# Patient Record
Sex: MALE | Race: WHITE | NOT HISPANIC OR LATINO | Employment: OTHER | URBAN - METROPOLITAN AREA
[De-identification: names, ages, dates, MRNs, and addresses within clinical notes are randomized per-mention and may not be internally consistent; named-entity substitution may affect disease eponyms.]

---

## 2023-10-25 DIAGNOSIS — I10 HYPERTENSION, UNSPECIFIED TYPE: Primary | ICD-10-CM

## 2023-10-25 PROBLEM — E11.9 DIABETES (MULTI): Status: ACTIVE | Noted: 2018-07-26

## 2023-10-25 PROBLEM — R60.9 EDEMA: Status: ACTIVE | Noted: 2023-10-25

## 2023-10-25 PROBLEM — R79.89 ELEVATED SERUM CREATININE: Status: ACTIVE | Noted: 2023-10-25

## 2023-10-25 PROBLEM — I47.10 PAROXYSMAL SVT (SUPRAVENTRICULAR TACHYCARDIA) (CMS-HCC): Status: ACTIVE | Noted: 2023-10-25

## 2023-10-25 PROBLEM — M54.31 RIGHT SCIATIC NERVE PAIN: Status: ACTIVE | Noted: 2023-03-07

## 2023-10-25 PROBLEM — E83.42 HYPOMAGNESEMIA: Status: ACTIVE | Noted: 2023-10-25

## 2023-10-25 PROBLEM — R00.2 PALPITATION: Status: ACTIVE | Noted: 2023-10-25

## 2023-10-25 PROBLEM — K57.31 HEMORRHAGE OF LARGE INTESTINE DUE TO DIVERTICULAR DISEASE: Status: ACTIVE | Noted: 2023-10-25

## 2023-10-25 PROBLEM — J06.9 VIRAL URI: Status: ACTIVE | Noted: 2020-12-29

## 2023-10-25 PROBLEM — D64.9 ANEMIA: Status: ACTIVE | Noted: 2022-07-20

## 2023-10-25 PROBLEM — N40.0 BPH WITH ELEVATED PSA: Status: ACTIVE | Noted: 2022-07-20

## 2023-10-25 PROBLEM — Z95.1 S/P CABG X 1: Status: ACTIVE | Noted: 2023-10-25

## 2023-10-25 PROBLEM — R07.89 ATYPICAL CHEST PAIN: Status: ACTIVE | Noted: 2023-10-25

## 2023-10-25 PROBLEM — K62.5 RECTAL HEMORRHAGE: Status: ACTIVE | Noted: 2023-10-25

## 2023-10-25 PROBLEM — R97.20 BPH WITH ELEVATED PSA: Status: ACTIVE | Noted: 2022-07-20

## 2023-10-25 PROBLEM — I34.0 MITRAL REGURGITATION: Status: ACTIVE | Noted: 2023-10-25

## 2023-10-25 PROBLEM — T63.441A BEE STING: Status: ACTIVE | Noted: 2023-10-25

## 2023-10-25 PROBLEM — I25.10 ARTERIOSCLEROSIS OF CORONARY ARTERY: Status: ACTIVE | Noted: 2021-07-19

## 2023-10-25 PROBLEM — R06.09 DOE (DYSPNEA ON EXERTION): Status: ACTIVE | Noted: 2023-10-25

## 2023-10-25 PROBLEM — R97.20 ELEVATED PSA: Status: ACTIVE | Noted: 2020-03-04

## 2023-10-25 PROBLEM — E78.5 HYPERLIPIDEMIA: Status: ACTIVE | Noted: 2023-10-25

## 2023-10-25 RX ORDER — VITS A,C,E/LUTEIN/MINERALS 300MCG-200
1 TABLET ORAL DAILY
COMMUNITY

## 2023-10-25 RX ORDER — ASPIRIN 81 MG/1
81 TABLET ORAL DAILY
COMMUNITY
Start: 2018-06-21

## 2023-10-25 RX ORDER — MULTIVITAMIN
1 TABLET ORAL DAILY
COMMUNITY

## 2023-10-25 RX ORDER — SPIRONOLACTONE 25 MG/1
25 TABLET ORAL DAILY
COMMUNITY
End: 2023-10-26 | Stop reason: WASHOUT

## 2023-10-25 RX ORDER — LOVASTATIN 40 MG/1
40 TABLET ORAL NIGHTLY
COMMUNITY

## 2023-10-25 RX ORDER — LISINOPRIL 40 MG/1
40 TABLET ORAL 2 TIMES DAILY
COMMUNITY
End: 2023-10-26 | Stop reason: SDUPTHER

## 2023-10-25 RX ORDER — AMLODIPINE BESYLATE 2.5 MG/1
2.5 TABLET ORAL DAILY
COMMUNITY
Start: 2020-03-04 | End: 2023-10-26 | Stop reason: WASHOUT

## 2023-10-26 ENCOUNTER — OFFICE VISIT (OUTPATIENT)
Dept: CARDIOLOGY | Facility: CLINIC | Age: 83
End: 2023-10-26
Payer: MEDICARE

## 2023-10-26 VITALS
WEIGHT: 166.2 LBS | OXYGEN SATURATION: 98 % | HEIGHT: 70 IN | SYSTOLIC BLOOD PRESSURE: 147 MMHG | HEART RATE: 62 BPM | BODY MASS INDEX: 23.79 KG/M2 | DIASTOLIC BLOOD PRESSURE: 68 MMHG

## 2023-10-26 DIAGNOSIS — I25.10 ARTERIOSCLEROSIS OF CORONARY ARTERY: ICD-10-CM

## 2023-10-26 DIAGNOSIS — I47.10 PAROXYSMAL SVT (SUPRAVENTRICULAR TACHYCARDIA) (CMS-HCC): ICD-10-CM

## 2023-10-26 DIAGNOSIS — Z95.1 S/P CABG X 1: ICD-10-CM

## 2023-10-26 DIAGNOSIS — E78.5 HYPERLIPIDEMIA, UNSPECIFIED HYPERLIPIDEMIA TYPE: ICD-10-CM

## 2023-10-26 DIAGNOSIS — I10 HYPERTENSION, UNSPECIFIED TYPE: Primary | ICD-10-CM

## 2023-10-26 PROCEDURE — 99214 OFFICE O/P EST MOD 30 MIN: CPT | Performed by: INTERNAL MEDICINE

## 2023-10-26 PROCEDURE — 3078F DIAST BP <80 MM HG: CPT | Performed by: INTERNAL MEDICINE

## 2023-10-26 PROCEDURE — 1036F TOBACCO NON-USER: CPT | Performed by: INTERNAL MEDICINE

## 2023-10-26 PROCEDURE — 1159F MED LIST DOCD IN RCRD: CPT | Performed by: INTERNAL MEDICINE

## 2023-10-26 PROCEDURE — 3077F SYST BP >= 140 MM HG: CPT | Performed by: INTERNAL MEDICINE

## 2023-10-26 RX ORDER — LISINOPRIL 40 MG/1
40 TABLET ORAL 2 TIMES DAILY
Qty: 90 TABLET | Refills: 3 | Status: SHIPPED | OUTPATIENT
Start: 2023-10-26 | End: 2024-04-15

## 2023-10-26 RX ORDER — AMLODIPINE BESYLATE 10 MG/1
10 TABLET ORAL DAILY
COMMUNITY
End: 2024-02-23

## 2023-10-26 ASSESSMENT — ENCOUNTER SYMPTOMS
MEMORY LOSS: 0
DEPRESSION: 0
NAUSEA: 0
BLOATING: 0
COUGH: 0
HEMATURIA: 0
FALLS: 0
DIARRHEA: 0
CONSTIPATION: 0
WHEEZING: 0
ABDOMINAL PAIN: 0
VOMITING: 0
MYALGIAS: 0
ALTERED MENTAL STATUS: 0
FEVER: 0
HEMOPTYSIS: 0
HEADACHES: 0
DYSURIA: 0
CHILLS: 0

## 2023-10-26 NOTE — PROGRESS NOTES
Chief complaint:  Follow-up     HPI  84 yo WM w/ h/o CAD s/p CABG x3v '00, parox SVT, HTN, HPL, TOB (quit '75) now here for cardiology f/u. He is feeling good. No chest pain. No dyspnea unless climbing stairs. +long h/o rare palps/hot flash x seconds. No LH/dizziness/syncope. No recent edema (prior occ edema). No claudication. No cough.   BP at home (occ checked; occ home nurse): 110s-120s/60s (home cuff correlated 2/20)  ECG 1/18: SR (81)  ECG 9/21: SR (64), occ PVCs  HM 12/17 (48h): SR, HR  (avg 69), occ-freq PVCs (9%), SVTx5 (long 7 mins), palps x1 correlated w/ PVCs  Echo 12/17: EF 55%, DD, mild-mod MR  Echo 4/23: EF 55%, mild AI, mild MR, IVC 2.0  CT chest 9/21: no acute abnl, no peric eff, no aneurysm     Review of Systems   Constitutional: Negative for chills, fever and malaise/fatigue.   HENT:  Negative for hearing loss.    Eyes:  Negative for visual disturbance.   Respiratory:  Negative for cough, hemoptysis and wheezing.    Skin:  Negative for rash.   Musculoskeletal:  Negative for falls and myalgias.   Gastrointestinal:  Negative for bloating, abdominal pain, constipation, diarrhea, dysphagia, nausea and vomiting.   Genitourinary:  Negative for dysuria and hematuria.   Neurological:  Negative for headaches.   Psychiatric/Behavioral:  Negative for altered mental status, depression and memory loss.         Social History     Tobacco Use    Smoking status: Former     Packs/day: 1.00     Years: 20.00     Additional pack years: 0.00     Total pack years: 20.00     Types: Cigarettes     Quit date: 2020     Years since quitting: 3.8    Smokeless tobacco: Never   Substance Use Topics    Alcohol use: Not on file        Family History   Problem Relation Name Age of Onset    Heart disease Father          Allergies   Allergen Reactions    Bee Venom Protein (Honey Bee) Unknown        Current Outpatient Medications   Medication Instructions    amLODIPine (NORVASC) 10 mg, oral, Daily    aspirin 81 mg, oral, Daily     lisinopril 40 mg, oral, 2 times daily    lovastatin (MEVACOR) 40 mg, oral, Nightly    metoprolol tartrate (LOPRESSOR) 100 mg, oral, 2 times daily    multivitamin tablet 1 tablet, oral, Daily    vit A,C and E-lutein-minerals (Ocuvite with Lutein) 300 mcg-200 mg-27 mg-2 mg tablet 1 tablet, oral, Daily        Vitals:    10/26/23 1125   BP: 150/74   Pulse:    SpO2:       Vitals:    10/26/23 1139   BP: 147/68   Pulse:    SpO2:         Physical Exam  Constitutional:       Appearance: Normal appearance.   HENT:      Head: Normocephalic and atraumatic.      Nose: Nose normal.   Cardiovascular:      Rate and Rhythm: Normal rate and regular rhythm.      Heart sounds: No murmur heard.  Pulmonary:      Effort: Pulmonary effort is normal.      Breath sounds: Normal breath sounds.   Abdominal:      Palpations: Abdomen is soft.      Tenderness: There is no abdominal tenderness.   Musculoskeletal:      Right lower leg: No edema.      Left lower leg: No edema.   Skin:     General: Skin is warm and dry.   Neurological:      General: No focal deficit present.      Mental Status: He is alert.   Psychiatric:         Mood and Affect: Mood normal.         Judgment: Judgment normal.        Lab Results   Component Value Date    CR 1.2      HGB 12.9      K 4.3      MG      BNP No results found for requested labs within last 365 days.            LDL       Assessment/Plan   82 yo WM w/ h/o CAD s/p CABG x3v '00, parox SVT, HTN, HPL, TOB (quit '75). Doing well. Only rare palps (?SVT). BP high today; however, good at home with visiting RN checks.  Notify me if BP at home >140/90.  -continue ASA 81 qd with food  -continue Metoprolol 100 bid (consider change to Carvedilol if needed for HTN)  -continue Lisinopril 40 bid  -continue Amlodipine 10 qd  -continue Lova 40 qhs (?GI upset on Atorva) -> goal LDL <70  -f/u 9 months (earlier if needed)     Alvin Regalado MD

## 2023-10-26 NOTE — TELEPHONE ENCOUNTER
Patient needed refill request for lisinopril as well. I know metoprolol was filled. I see you saw him in clinic today wasn't sure if this was refilled. If not please approve pending order. Thanks.

## 2024-01-15 ENCOUNTER — TELEPHONE (OUTPATIENT)
Dept: PRIMARY CARE | Facility: CLINIC | Age: 84
End: 2024-01-15
Payer: MEDICARE

## 2024-01-15 DIAGNOSIS — E11.9 TYPE 2 DIABETES MELLITUS WITHOUT COMPLICATION, WITHOUT LONG-TERM CURRENT USE OF INSULIN (MULTI): ICD-10-CM

## 2024-01-16 ENCOUNTER — LAB (OUTPATIENT)
Dept: LAB | Facility: LAB | Age: 84
End: 2024-01-16
Payer: MEDICARE

## 2024-01-16 DIAGNOSIS — E11.9 TYPE 2 DIABETES MELLITUS WITHOUT COMPLICATION, WITHOUT LONG-TERM CURRENT USE OF INSULIN (MULTI): ICD-10-CM

## 2024-01-16 LAB
ALBUMIN SERPL-MCNC: 4.2 G/DL (ref 3.5–5)
ALP BLD-CCNC: 86 U/L (ref 35–125)
ALT SERPL-CCNC: 8 U/L (ref 5–40)
ANION GAP SERPL CALC-SCNC: 11 MMOL/L
AST SERPL-CCNC: 14 U/L (ref 5–40)
BILIRUB SERPL-MCNC: 0.6 MG/DL (ref 0.1–1.2)
BUN SERPL-MCNC: 17 MG/DL (ref 8–25)
CALCIUM SERPL-MCNC: 9.5 MG/DL (ref 8.5–10.4)
CHLORIDE SERPL-SCNC: 104 MMOL/L (ref 97–107)
CO2 SERPL-SCNC: 23 MMOL/L (ref 24–31)
CREAT SERPL-MCNC: 1.2 MG/DL (ref 0.4–1.6)
EGFRCR SERPLBLD CKD-EPI 2021: 60 ML/MIN/1.73M*2
EST. AVERAGE GLUCOSE BLD GHB EST-MCNC: 123 MG/DL
GLUCOSE SERPL-MCNC: 143 MG/DL (ref 65–99)
HBA1C MFR BLD: 5.9 %
POTASSIUM SERPL-SCNC: 3.9 MMOL/L (ref 3.4–5.1)
PROT SERPL-MCNC: 6.8 G/DL (ref 5.9–7.9)
SODIUM SERPL-SCNC: 138 MMOL/L (ref 133–145)

## 2024-01-16 PROCEDURE — 36415 COLL VENOUS BLD VENIPUNCTURE: CPT

## 2024-01-16 PROCEDURE — 80053 COMPREHEN METABOLIC PANEL: CPT

## 2024-01-16 PROCEDURE — 83036 HEMOGLOBIN GLYCOSYLATED A1C: CPT

## 2024-01-18 ENCOUNTER — LAB (OUTPATIENT)
Dept: LAB | Facility: LAB | Age: 84
End: 2024-01-18
Payer: MEDICARE

## 2024-01-18 DIAGNOSIS — E11.9 TYPE 2 DIABETES MELLITUS WITHOUT COMPLICATION, WITHOUT LONG-TERM CURRENT USE OF INSULIN (MULTI): ICD-10-CM

## 2024-01-18 LAB
CREAT UR-MCNC: 166 MG/DL
MICROALBUMIN UR-MCNC: 52 MG/L (ref 0–23)
MICROALBUMIN/CREAT UR: 31.3 UG/MG CREAT

## 2024-01-18 PROCEDURE — 82043 UR ALBUMIN QUANTITATIVE: CPT

## 2024-01-18 PROCEDURE — 82570 ASSAY OF URINE CREATININE: CPT

## 2024-01-22 ENCOUNTER — TELEPHONE (OUTPATIENT)
Dept: PRIMARY CARE | Facility: CLINIC | Age: 84
End: 2024-01-22

## 2024-01-22 ENCOUNTER — OFFICE VISIT (OUTPATIENT)
Dept: PRIMARY CARE | Facility: CLINIC | Age: 84
End: 2024-01-22
Payer: MEDICARE

## 2024-01-22 VITALS
RESPIRATION RATE: 16 BRPM | HEART RATE: 64 BPM | DIASTOLIC BLOOD PRESSURE: 62 MMHG | BODY MASS INDEX: 23.82 KG/M2 | OXYGEN SATURATION: 99 % | WEIGHT: 166 LBS | SYSTOLIC BLOOD PRESSURE: 120 MMHG

## 2024-01-22 DIAGNOSIS — Z12.5 SCREENING FOR PROSTATE CANCER: ICD-10-CM

## 2024-01-22 DIAGNOSIS — R73.03 PREDIABETES: ICD-10-CM

## 2024-01-22 DIAGNOSIS — I10 BENIGN ESSENTIAL HTN: ICD-10-CM

## 2024-01-22 DIAGNOSIS — I25.10 ARTERIOSCLEROSIS OF CORONARY ARTERY: ICD-10-CM

## 2024-01-22 DIAGNOSIS — E08.65 DIABETES MELLITUS DUE TO UNDERLYING CONDITION WITH HYPERGLYCEMIA, WITHOUT LONG-TERM CURRENT USE OF INSULIN (MULTI): ICD-10-CM

## 2024-01-22 DIAGNOSIS — E78.00 PURE HYPERCHOLESTEROLEMIA: Primary | ICD-10-CM

## 2024-01-22 DIAGNOSIS — E78.00 PURE HYPERCHOLESTEROLEMIA: ICD-10-CM

## 2024-01-22 DIAGNOSIS — Z79.899 MEDICATION MANAGEMENT: ICD-10-CM

## 2024-01-22 PROCEDURE — 3074F SYST BP LT 130 MM HG: CPT | Performed by: FAMILY MEDICINE

## 2024-01-22 PROCEDURE — 3078F DIAST BP <80 MM HG: CPT | Performed by: FAMILY MEDICINE

## 2024-01-22 PROCEDURE — 1036F TOBACCO NON-USER: CPT | Performed by: FAMILY MEDICINE

## 2024-01-22 PROCEDURE — 1125F AMNT PAIN NOTED PAIN PRSNT: CPT | Performed by: FAMILY MEDICINE

## 2024-01-22 PROCEDURE — 1159F MED LIST DOCD IN RCRD: CPT | Performed by: FAMILY MEDICINE

## 2024-01-22 PROCEDURE — 99214 OFFICE O/P EST MOD 30 MIN: CPT | Performed by: FAMILY MEDICINE

## 2024-01-22 ASSESSMENT — PAIN SCALES - GENERAL: PAINLEVEL: 4

## 2024-01-22 ASSESSMENT — PATIENT HEALTH QUESTIONNAIRE - PHQ9
2. FEELING DOWN, DEPRESSED OR HOPELESS: NOT AT ALL
SUM OF ALL RESPONSES TO PHQ9 QUESTIONS 1 AND 2: 0
1. LITTLE INTEREST OR PLEASURE IN DOING THINGS: NOT AT ALL

## 2024-01-22 ASSESSMENT — ENCOUNTER SYMPTOMS
LOSS OF SENSATION IN FEET: 0
OCCASIONAL FEELINGS OF UNSTEADINESS: 0
DEPRESSION: 0

## 2024-01-22 NOTE — PROGRESS NOTES
Subjective   Patient ID: Olayinka Burnette is a 83 y.o. male who presents for Diabetes.    HPI   1. He is here for follow-up of high cholesterol.  He is on atorvastatin 20 mg.  Most recent LDL is 61.      2. He is also here for follow-up of benign essential hypertension.  He is on lisinopril 80 mg, metoprolol succinate 100 mg and amlodipine 2.5 mg (added in 2019 by cardiologist).  He is followed by cardiologist (Dr. Regalado at ).      3.  He is also here for follow-up of diabetes type 2.  He is on no medication.   Recent A1C is  5.9.      4. He is also here for follow-up of coronary artery disease.   He is s/p CABG x3 in 2000. He also has paroxysmal supraventricular tachycardia.  He is followed by cardiologist Dr. Regalado.      Review of Systems    Denies headache, blurred vision, chest pain, shortness of breath, nausea or vomiting, change in bowel habits or leg pain or swelling.    Objective   /62   Pulse 64   Resp 16   Wt 75.3 kg (166 lb)   SpO2 99%   BMI 23.82 kg/m²     Physical Exam  Vitals and nurse's notes reviewed  General: no acute distress  HEENT: Normal  Neck: Supple  Lungs: Clear  Cardio: RRR w/o murmur  Extremities: No edema, no calf tenderness  Neuro: Alert and oriented with no focal deficits    Assessment/Plan   Problem List Items Addressed This Visit             ICD-10-CM       High    Arteriosclerosis of coronary artery I25.10     Continue current medication and following with cardiologist.         Benign essential HTN I10     Continue current medication and following with cardiologist.         Hyperlipidemia - Primary E78.5     Continue current medications.  Recheck in 6 months.           Prediabetes R73.03     Continue off medication.  Continue low-carb diet.  Recheck A1c again in 6 months at [facility].

## 2024-07-22 ENCOUNTER — LAB (OUTPATIENT)
Dept: LAB | Facility: LAB | Age: 84
End: 2024-07-22
Payer: MEDICARE

## 2024-07-22 DIAGNOSIS — E78.00 PURE HYPERCHOLESTEROLEMIA: ICD-10-CM

## 2024-07-22 DIAGNOSIS — Z79.899 MEDICATION MANAGEMENT: ICD-10-CM

## 2024-07-22 DIAGNOSIS — E08.65 DIABETES MELLITUS DUE TO UNDERLYING CONDITION WITH HYPERGLYCEMIA, WITHOUT LONG-TERM CURRENT USE OF INSULIN (MULTI): ICD-10-CM

## 2024-07-22 DIAGNOSIS — I10 BENIGN ESSENTIAL HTN: ICD-10-CM

## 2024-07-22 DIAGNOSIS — Z12.5 SCREENING FOR PROSTATE CANCER: ICD-10-CM

## 2024-07-22 LAB
ALBUMIN SERPL-MCNC: 4.3 G/DL (ref 3.5–5)
ALP BLD-CCNC: 84 U/L (ref 35–125)
ALT SERPL-CCNC: 11 U/L (ref 5–40)
ANION GAP SERPL CALC-SCNC: 12 MMOL/L
AST SERPL-CCNC: 15 U/L (ref 5–40)
BASOPHILS # BLD AUTO: 0.05 X10*3/UL (ref 0–0.1)
BASOPHILS NFR BLD AUTO: 0.9 %
BILIRUB SERPL-MCNC: 0.4 MG/DL (ref 0.1–1.2)
BUN SERPL-MCNC: 20 MG/DL (ref 8–25)
CALCIUM SERPL-MCNC: 9.2 MG/DL (ref 8.5–10.4)
CHLORIDE SERPL-SCNC: 105 MMOL/L (ref 97–107)
CHOLEST SERPL-MCNC: 149 MG/DL (ref 133–200)
CHOLEST/HDLC SERPL: 3.5 {RATIO}
CO2 SERPL-SCNC: 22 MMOL/L (ref 24–31)
CREAT SERPL-MCNC: 1.1 MG/DL (ref 0.4–1.6)
EGFRCR SERPLBLD CKD-EPI 2021: 66 ML/MIN/1.73M*2
EOSINOPHIL # BLD AUTO: 0.35 X10*3/UL (ref 0–0.4)
EOSINOPHIL NFR BLD AUTO: 6.3 %
ERYTHROCYTE [DISTWIDTH] IN BLOOD BY AUTOMATED COUNT: 14.3 % (ref 11.5–14.5)
EST. AVERAGE GLUCOSE BLD GHB EST-MCNC: 123 MG/DL
GLUCOSE SERPL-MCNC: 124 MG/DL (ref 65–99)
HBA1C MFR BLD: 5.9 %
HCT VFR BLD AUTO: 39.6 % (ref 41–52)
HDLC SERPL-MCNC: 42 MG/DL
HGB BLD-MCNC: 12.6 G/DL (ref 13.5–17.5)
IMM GRANULOCYTES # BLD AUTO: 0.02 X10*3/UL (ref 0–0.5)
IMM GRANULOCYTES NFR BLD AUTO: 0.4 % (ref 0–0.9)
LDLC SERPL CALC-MCNC: 76 MG/DL (ref 65–130)
LYMPHOCYTES # BLD AUTO: 1.41 X10*3/UL (ref 0.8–3)
LYMPHOCYTES NFR BLD AUTO: 25.3 %
MCH RBC QN AUTO: 30.2 PG (ref 26–34)
MCHC RBC AUTO-ENTMCNC: 31.8 G/DL (ref 32–36)
MCV RBC AUTO: 95 FL (ref 80–100)
MONOCYTES # BLD AUTO: 0.42 X10*3/UL (ref 0.05–0.8)
MONOCYTES NFR BLD AUTO: 7.5 %
NEUTROPHILS # BLD AUTO: 3.32 X10*3/UL (ref 1.6–5.5)
NEUTROPHILS NFR BLD AUTO: 59.6 %
NRBC BLD-RTO: 0 /100 WBCS (ref 0–0)
PLATELET # BLD AUTO: 191 X10*3/UL (ref 150–450)
POTASSIUM SERPL-SCNC: 4.3 MMOL/L (ref 3.4–5.1)
PROT SERPL-MCNC: 6.9 G/DL (ref 5.9–7.9)
RBC # BLD AUTO: 4.17 X10*6/UL (ref 4.5–5.9)
SODIUM SERPL-SCNC: 139 MMOL/L (ref 133–145)
TRIGL SERPL-MCNC: 157 MG/DL (ref 40–150)
WBC # BLD AUTO: 5.6 X10*3/UL (ref 4.4–11.3)

## 2024-07-22 PROCEDURE — 85025 COMPLETE CBC W/AUTO DIFF WBC: CPT

## 2024-07-22 PROCEDURE — 36415 COLL VENOUS BLD VENIPUNCTURE: CPT

## 2024-07-22 PROCEDURE — G0103 PSA SCREENING: HCPCS

## 2024-07-22 PROCEDURE — 83036 HEMOGLOBIN GLYCOSYLATED A1C: CPT

## 2024-07-22 PROCEDURE — 80053 COMPREHEN METABOLIC PANEL: CPT

## 2024-07-22 PROCEDURE — 80061 LIPID PANEL: CPT

## 2024-07-22 PROCEDURE — 84154 ASSAY OF PSA FREE: CPT

## 2024-07-23 ENCOUNTER — TELEPHONE (OUTPATIENT)
Dept: PRIMARY CARE | Facility: CLINIC | Age: 84
End: 2024-07-23
Payer: MEDICARE

## 2024-07-23 NOTE — TELEPHONE ENCOUNTER
Dilia from  lab services called [phone]   Urinalysis was canceled due to not being enough in the specimen

## 2024-07-24 LAB
PSA FREE MFR SERPL: 24 %
PSA FREE SERPL-MCNC: 1.2 NG/ML
PSA SERPL IA-MCNC: 5.1 NG/ML (ref 0–4)

## 2024-07-25 ENCOUNTER — APPOINTMENT (OUTPATIENT)
Dept: CARDIOLOGY | Facility: CLINIC | Age: 84
End: 2024-07-25
Payer: MEDICARE

## 2024-07-25 VITALS
RESPIRATION RATE: 16 BRPM | DIASTOLIC BLOOD PRESSURE: 76 MMHG | OXYGEN SATURATION: 98 % | BODY MASS INDEX: 24.2 KG/M2 | SYSTOLIC BLOOD PRESSURE: 150 MMHG | HEART RATE: 64 BPM | HEIGHT: 70 IN | WEIGHT: 169 LBS

## 2024-07-25 DIAGNOSIS — I10 HYPERTENSION, UNSPECIFIED TYPE: ICD-10-CM

## 2024-07-25 DIAGNOSIS — R60.9 EDEMA, UNSPECIFIED TYPE: ICD-10-CM

## 2024-07-25 DIAGNOSIS — E78.5 HYPERLIPIDEMIA, UNSPECIFIED HYPERLIPIDEMIA TYPE: ICD-10-CM

## 2024-07-25 DIAGNOSIS — Z95.1 S/P CABG X 1: ICD-10-CM

## 2024-07-25 DIAGNOSIS — I25.10 CORONARY ARTERY DISEASE INVOLVING NATIVE CORONARY ARTERY OF NATIVE HEART WITHOUT ANGINA PECTORIS: Primary | ICD-10-CM

## 2024-07-25 PROCEDURE — 1036F TOBACCO NON-USER: CPT | Performed by: INTERNAL MEDICINE

## 2024-07-25 PROCEDURE — 99214 OFFICE O/P EST MOD 30 MIN: CPT | Performed by: INTERNAL MEDICINE

## 2024-07-25 PROCEDURE — 3077F SYST BP >= 140 MM HG: CPT | Performed by: INTERNAL MEDICINE

## 2024-07-25 PROCEDURE — 3078F DIAST BP <80 MM HG: CPT | Performed by: INTERNAL MEDICINE

## 2024-07-25 PROCEDURE — 1159F MED LIST DOCD IN RCRD: CPT | Performed by: INTERNAL MEDICINE

## 2024-07-25 RX ORDER — CHLORTHALIDONE 25 MG/1
25 TABLET ORAL DAILY
Qty: 90 TABLET | Refills: 1 | Status: SHIPPED | OUTPATIENT
Start: 2024-07-25 | End: 2025-07-25

## 2024-07-25 ASSESSMENT — ENCOUNTER SYMPTOMS
FEVER: 0
VOMITING: 0
MYALGIAS: 0
COUGH: 0
HEMOPTYSIS: 0
CHILLS: 0
HEMATURIA: 0
DEPRESSION: 0
ABDOMINAL PAIN: 0
DYSURIA: 0
FALLS: 0
HEADACHES: 0
ALTERED MENTAL STATUS: 0
BLOATING: 0
WHEEZING: 0
CONSTIPATION: 0
MEMORY LOSS: 0
NAUSEA: 0
DIARRHEA: 0

## 2024-07-25 ASSESSMENT — PATIENT HEALTH QUESTIONNAIRE - PHQ9
1. LITTLE INTEREST OR PLEASURE IN DOING THINGS: NOT AT ALL
2. FEELING DOWN, DEPRESSED OR HOPELESS: NOT AT ALL
SUM OF ALL RESPONSES TO PHQ9 QUESTIONS 1 AND 2: 0

## 2024-07-25 NOTE — PROGRESS NOTES
Chief complaint:  Follow-up     HPI  83 yo WM w/ h/o CAD s/p CABG x3v '00, parox SVT, HTN, HPL, TOB (quit ') now here for cardiology f/u. He is feeling good. No chest pain. No dyspnea unless climbing stairs. +long h/o rare palps/hot flash x seconds. No LH/dizziness/syncope. +occ LE edema, now worse. No claudication. No cough.   BP by home nurse: ~110s/60s   ECG : SR (81)  ECG : SR (64), occ PVCs  HM  (48h): SR, HR  (avg 69), occ-freq PVCs (9%), SVTx5 (long 7 mins), palps x1 correlated w/ PVCs  Echo : EF 55%, DD, mild-mod MR  Echo : EF 55%, mild AI, mild MR, IVC 2.0  CT chest : no acute abnl, no peric eff, no aneurysm     Review of Systems   Constitutional: Negative for chills, fever and malaise/fatigue.   HENT:  Negative for hearing loss.    Eyes:  Negative for visual disturbance.   Respiratory:  Negative for cough, hemoptysis and wheezing.    Skin:  Negative for rash.   Musculoskeletal:  Negative for falls and myalgias.   Gastrointestinal:  Negative for bloating, abdominal pain, constipation, diarrhea, dysphagia, nausea and vomiting.   Genitourinary:  Negative for dysuria and hematuria.   Neurological:  Negative for headaches.   Psychiatric/Behavioral:  Negative for altered mental status, depression and memory loss.       Social History     Tobacco Use    Smoking status: Former     Current packs/day: 0.00     Average packs/day: 1 pack/day for 5.0 years (5.0 ttl pk-yrs)     Types: Cigarettes     Start date:      Quit date: 2020     Years since quittin.5     Passive exposure: Past    Smokeless tobacco: Never   Substance Use Topics    Alcohol use: Yes     Comment: socially      Family History   Problem Relation Name Age of Onset    Heart disease Father        Allergies   Allergen Reactions    Bee Venom Protein (Honey Bee) Unknown      Current Outpatient Medications   Medication Instructions    amLODIPine (NORVASC) 10 mg, oral, Daily    aspirin 81 mg, oral, Daily    lisinopril  "40 mg, oral, 2 times daily    lovastatin (MEVACOR) 40 mg, oral, Nightly    metoprolol tartrate (LOPRESSOR) 100 mg, oral, 2 times daily    multivitamin tablet 1 tablet, oral, Daily    vit A,C and E-lutein-minerals (Ocuvite with Lutein) 300 mcg-200 mg-27 mg-2 mg tablet 1 tablet, oral, Daily      Vitals:    24 1122   BP: 165/75   Pulse: 64   Resp: 16   SpO2: 98%      /76   Pulse 64   Resp 16   Ht 1.778 m (5' 10\")   Wt 76.7 kg (169 lb)   SpO2 98%   BMI 24.25 kg/m²      Physical Exam  Constitutional:       Appearance: Normal appearance.   HENT:      Head: Normocephalic and atraumatic.      Nose: Nose normal.   Cardiovascular:      Rate and Rhythm: Normal rate and regular rhythm. Extrasystoles are present.     Heart sounds: No murmur heard.  Pulmonary:      Effort: Pulmonary effort is normal.      Breath sounds: Normal breath sounds.   Abdominal:      Palpations: Abdomen is soft.      Tenderness: There is no abdominal tenderness.   Musculoskeletal:      Right lower le+ Pitting Edema present.      Left lower le+ Pitting Edema present.   Skin:     General: Skin is warm and dry.   Neurological:      General: No focal deficit present.      Mental Status: He is alert.   Psychiatric:         Mood and Affect: Mood normal.         Judgment: Judgment normal.        Results/Data   Cr 1.1, K 4.3, LFT nl, LDL 76, HDL 42, , Chol 149, HGB 12.6, , hgba1c 5.9   Cr 1.3, K 4.2, LFT nl, HGB 13.5, , hgba1c 5.8   cr 1.1, K 4.3, LFT nl, LDL 81, HDL 47, TG 99, Chol 148  3/22 Cr 1.1, K 4.6, LFT nl, HGB 11.5,    1.3, K 4.5, LFT nl, LDL 68, HDL 38, , Chol 131, hgba1c 6.4   Cr 1.1, K 4.3, LFT nl, LDL 65, HDL 48, , Chol 137, HGB 13, , hgba1c 5.4, TSH 1.22   Cr 1.2, K 4.4, LFT nl, LDL 69, HDL 53, TG 62, Chol 134, HGB 13.1, , hgba1c 5.6, TSH 0.69   Cr 1.2, K 4.7, LFT nl,LDL 65, HDL 55, TG 53, Chol 131, HGB 13.8, , hgba1c 5.6, TSH " 1.23  7/19 Cr 1.2, K 4.4, LFT nl, LDL 68, HDL 55, TG 52, Chol 133, hgba1c 5.5  1/19 Cr 1.1, K 4.2, LFT nl, LDL 63, HDL 49, TG 81, Chol 128, HGB 13.6, , TSH 0.94  1/18 hgba1c 5.9  11/17 Cr 1.21, K 3.9, Mg 2.28, LDL 57, HDL 38, , Chol 126, TSH 1.15  9/17 hgba1c 5.8     Assessment/Plan   85 yo WM w/ h/o CAD s/p CABG x3v '00, parox SVT, HTN, HPL, TOB (quit '75) now w/ LE edema, possibly due to Amlodipine, summer humidity, CHF. No other sig cardiac symptoms.   Will start by changing Amlodipine to Chlorthalidone. Can check BNP with next lab.  BP high today; normal by visiting nurse at home.  -continue ASA 81 qd with food  -continue Metoprolol 100 bid (consider change to Carvedilol if needed for HTN)  -continue Lisinopril 40 bid  -chnage Amlodipine 10 every day to Chlorthalidone 25 every day (can then increase if needed)  -continue Lova 40 qhs (?GI upset on Atorva) -> goal LDL <70  -f/u 6 months (earlier if needed)     Alvin Regalado MD

## 2024-07-25 NOTE — PATIENT INSTRUCTIONS
Change Amlodipine to Chlorthalidone 25mg 1x/day    Goal BP <130/80 (if running high, let me know [phone])

## 2024-07-29 ENCOUNTER — APPOINTMENT (OUTPATIENT)
Dept: PRIMARY CARE | Facility: CLINIC | Age: 84
End: 2024-07-29
Payer: MEDICARE

## 2024-07-29 ENCOUNTER — TELEPHONE (OUTPATIENT)
Dept: PRIMARY CARE | Facility: CLINIC | Age: 84
End: 2024-07-29

## 2024-07-29 VITALS
HEART RATE: 59 BPM | RESPIRATION RATE: 16 BRPM | BODY MASS INDEX: 24.86 KG/M2 | SYSTOLIC BLOOD PRESSURE: 110 MMHG | OXYGEN SATURATION: 99 % | DIASTOLIC BLOOD PRESSURE: 70 MMHG | WEIGHT: 164 LBS | HEIGHT: 68 IN

## 2024-07-29 DIAGNOSIS — I10 BENIGN ESSENTIAL HTN: ICD-10-CM

## 2024-07-29 DIAGNOSIS — R73.03 PREDIABETES: Primary | ICD-10-CM

## 2024-07-29 DIAGNOSIS — I25.10 CORONARY ARTERY DISEASE INVOLVING NATIVE CORONARY ARTERY OF NATIVE HEART WITHOUT ANGINA PECTORIS: ICD-10-CM

## 2024-07-29 DIAGNOSIS — E78.5 HYPERLIPIDEMIA, UNSPECIFIED HYPERLIPIDEMIA TYPE: ICD-10-CM

## 2024-07-29 DIAGNOSIS — E08.65 DIABETES MELLITUS DUE TO UNDERLYING CONDITION WITH HYPERGLYCEMIA, WITHOUT LONG-TERM CURRENT USE OF INSULIN (MULTI): ICD-10-CM

## 2024-07-29 DIAGNOSIS — Z00.00 WELL ADULT EXAM: ICD-10-CM

## 2024-07-29 DIAGNOSIS — E78.00 PURE HYPERCHOLESTEROLEMIA: ICD-10-CM

## 2024-07-29 PROCEDURE — 3078F DIAST BP <80 MM HG: CPT | Performed by: FAMILY MEDICINE

## 2024-07-29 PROCEDURE — 3074F SYST BP LT 130 MM HG: CPT | Performed by: FAMILY MEDICINE

## 2024-07-29 PROCEDURE — 1170F FXNL STATUS ASSESSED: CPT | Performed by: FAMILY MEDICINE

## 2024-07-29 PROCEDURE — 99397 PER PM REEVAL EST PAT 65+ YR: CPT | Performed by: FAMILY MEDICINE

## 2024-07-29 PROCEDURE — 1036F TOBACCO NON-USER: CPT | Performed by: FAMILY MEDICINE

## 2024-07-29 PROCEDURE — G0439 PPPS, SUBSEQ VISIT: HCPCS | Performed by: FAMILY MEDICINE

## 2024-07-29 PROCEDURE — 1159F MED LIST DOCD IN RCRD: CPT | Performed by: FAMILY MEDICINE

## 2024-07-29 PROCEDURE — 1126F AMNT PAIN NOTED NONE PRSNT: CPT | Performed by: FAMILY MEDICINE

## 2024-07-29 PROCEDURE — 99212 OFFICE O/P EST SF 10 MIN: CPT | Performed by: FAMILY MEDICINE

## 2024-07-29 ASSESSMENT — ACTIVITIES OF DAILY LIVING (ADL)
TAKING_MEDICATION: INDEPENDENT
DRESSING: INDEPENDENT
GROCERY_SHOPPING: INDEPENDENT
DOING_HOUSEWORK: INDEPENDENT
MANAGING_FINANCES: INDEPENDENT
BATHING: INDEPENDENT

## 2024-07-29 ASSESSMENT — ENCOUNTER SYMPTOMS
OCCASIONAL FEELINGS OF UNSTEADINESS: 0
LOSS OF SENSATION IN FEET: 0
DEPRESSION: 0

## 2024-07-29 ASSESSMENT — PAIN SCALES - GENERAL: PAINLEVEL: 0-NO PAIN

## 2024-07-29 NOTE — ASSESSMENT & PLAN NOTE
Continue lisinopril, metoprolol and amlodipine and follow-up in 6 months.  Also continue following up with his cardiologist.

## 2024-07-29 NOTE — ASSESSMENT & PLAN NOTE
Continue current medication including lisinopril metoprolol and amlodipine and continue following with his cardiologist Dr. Regalado.

## 2024-07-29 NOTE — PROGRESS NOTES
"Subjective   Reason for Visit: Olayinka Burnette is an 84 y.o. male here for a Medicare Wellness visit.     Past Medical, Surgical, and Family History reviewed and updated in chart.    Reviewed all medications by prescribing practitioner or clinical pharmacist (such as prescriptions, OTCs, herbal therapies and supplements) and documented in the medical record.    HPI    Patient Care Team:  Min Spann MD as PCP - General  Min Spann MD as PCP - [employer] PCP   He had a colonoscopy by Dr. Huerta in 4/22 in follow-up of a diverticular bleed.        2. He is here for follow-up of high cholesterol.  He is on atorvastatin 20 mg.  Recent LDL is 76.      3. He is also here for follow-up of benign essential hypertension.  He is on lisinopril 80 mg, metoprolol succinate 100 mg and amlodipine 2.5 mg (added in 2019 by cardiologist).  He is followed by cardiologist (Dr. Regalado at ).      4.  He is also here for follow-up of diabetes type 2.  He is on no medication.   Recent A1c is 5.9.      5. He is also here for follow-up of coronary artery disease.   He is s/p CABG x3 in 2000. He also has paroxysmal supraventricular tachycardia.  He is followed by cardiologist Dr. Regalado.         6.  He is also here for follow-up of BPH. He is followed by Dr. Rosenthal.       7.  he is also here for anemia.  Recent hemoglobin is 12.6.   He had a diverticular bleed in 4/22.  He states he has had no further episodes of blood in his stool.  He saw Dr. Huerta in follow-up after this.   Review of Systems  Denies headache, blurred vision, chest pain, shortness of breath, nausea or vomiting, change in bowel habits or leg pain or swelling.    Objective   Vitals:  /70 (BP Location: Left arm, Patient Position: Sitting, BP Cuff Size: Large adult)   Pulse 59   Resp 16   Ht 1.727 m (5' 8\")   Wt 74.4 kg (164 lb)   SpO2 99%   BMI 24.94 kg/m²       Physical Exam  General appearance: Vital signs have been reviewed.  " Comfortable.  Well-nourished and well-developed.He is alert and oriented x3 and appears his stated age.The patient is cooperative with exam.  Head: Hair pattern reveals a normal pattern for patient's age and face shows no abnormalities.  Eyes: PERRLA x2, EOMI x2, conjunctive a and sclera are clear.  Ears: External bilateral ears with normal helix, tragus and earlobe.Bilateral canals are normal.Bilateral tympanic membranes are pearly gray and landmarks are well visualized.  Nose: Nasal mucosa both nostrils reveals no polyps, ulcerations or lesions.  Throat:Teeth are in good repair.  Posterior pharynx reveals no abnormalities.  Neck: Supple without lymphadenopathy, thyromegaly or carotid bruits.  Lungs:Clear to auscultation bilaterally with no wheezes, rales or rhonchi.  Cardiovascular: RRR without MRG.No carotid bruits.  Extremities without edema and pulses are intact.  Abdomen: Soft, NT, no masses, no hepatosplenomegaly.  Genitalia: No testicular masses.  No evidence of inguinal hernia.Nontender.  Rectal: No masses.  Prostate is normal in size and shape with no nodules. It is nontender.  Musculoskeletal: 5/5 and equal strength in bilateral upper and lower extremities.  Skin: Good turgor and without rashes.  Neurological: Good overall strength and no focal deficits.  Cranial nerves II through XII are grossly intact.  Psychiatric: Patient has appropriate judgment with good insight.  Mood is appropriate.    Assessment/Plan   Problem List Items Addressed This Visit             ICD-10-CM       High    CAD (coronary artery disease) I25.10     Continue current medication including lisinopril metoprolol and amlodipine and continue following with his cardiologist Dr. Regalado.         Benign essential HTN I10     Continue lisinopril, metoprolol and amlodipine and follow-up in 6 months.  Also continue following up with his cardiologist.         Hyperlipidemia E78.5     Continue atorvastatin.  Continue improved diet.  Recheck  lipid panel in 6 months.         Prediabetes - Primary R73.03     Keep off medication.  Continue cutting back on carbohydrates.  Will recheck A1c in 6 months.         Well adult exam Z00.00     Anticipatory guidance given.

## 2024-10-04 ENCOUNTER — TELEPHONE (OUTPATIENT)
Dept: CARDIOLOGY | Facility: HOSPITAL | Age: 84
End: 2024-10-04

## 2024-10-04 DIAGNOSIS — I10 HYPERTENSION, UNSPECIFIED TYPE: ICD-10-CM

## 2024-10-04 DIAGNOSIS — E78.5 HYPERLIPIDEMIA, UNSPECIFIED HYPERLIPIDEMIA TYPE: Primary | ICD-10-CM

## 2024-10-04 DIAGNOSIS — R60.9 EDEMA, UNSPECIFIED TYPE: ICD-10-CM

## 2024-10-04 RX ORDER — LOVASTATIN 40 MG/1
40 TABLET ORAL NIGHTLY
Qty: 90 TABLET | Refills: 3 | Status: SHIPPED | OUTPATIENT
Start: 2024-10-04

## 2024-10-04 RX ORDER — CHLORTHALIDONE 25 MG/1
25 TABLET ORAL DAILY
Qty: 90 TABLET | Refills: 3 | Status: SHIPPED | OUTPATIENT
Start: 2024-10-04 | End: 2025-10-04

## 2025-01-23 ENCOUNTER — OFFICE VISIT (OUTPATIENT)
Dept: CARDIOLOGY | Facility: CLINIC | Age: 85
End: 2025-01-23
Payer: MEDICARE

## 2025-01-23 ENCOUNTER — LAB (OUTPATIENT)
Dept: LAB | Facility: LAB | Age: 85
End: 2025-01-23
Payer: MEDICARE

## 2025-01-23 VITALS
SYSTOLIC BLOOD PRESSURE: 136 MMHG | WEIGHT: 169 LBS | HEIGHT: 70 IN | BODY MASS INDEX: 24.2 KG/M2 | OXYGEN SATURATION: 100 % | DIASTOLIC BLOOD PRESSURE: 79 MMHG | HEART RATE: 60 BPM

## 2025-01-23 DIAGNOSIS — E08.65 DIABETES MELLITUS DUE TO UNDERLYING CONDITION WITH HYPERGLYCEMIA, WITHOUT LONG-TERM CURRENT USE OF INSULIN: ICD-10-CM

## 2025-01-23 DIAGNOSIS — I10 HYPERTENSION, UNSPECIFIED TYPE: ICD-10-CM

## 2025-01-23 DIAGNOSIS — E78.00 PURE HYPERCHOLESTEROLEMIA: ICD-10-CM

## 2025-01-23 DIAGNOSIS — I10 BENIGN ESSENTIAL HTN: ICD-10-CM

## 2025-01-23 LAB
ALBUMIN SERPL BCP-MCNC: 4.5 G/DL (ref 3.4–5)
ALP SERPL-CCNC: 55 U/L (ref 33–136)
ALT SERPL W P-5'-P-CCNC: 11 U/L (ref 10–52)
ANION GAP SERPL CALC-SCNC: 15 MMOL/L (ref 10–20)
AST SERPL W P-5'-P-CCNC: 13 U/L (ref 9–39)
BILIRUB SERPL-MCNC: 0.5 MG/DL (ref 0–1.2)
BUN SERPL-MCNC: 35 MG/DL (ref 6–23)
CALCIUM SERPL-MCNC: 9.6 MG/DL (ref 8.6–10.3)
CHLORIDE SERPL-SCNC: 107 MMOL/L (ref 98–107)
CHOLEST SERPL-MCNC: 170 MG/DL (ref 0–199)
CHOLESTEROL/HDL RATIO: 4.6
CO2 SERPL-SCNC: 24 MMOL/L (ref 21–32)
CREAT SERPL-MCNC: 1.65 MG/DL (ref 0.5–1.3)
EGFRCR SERPLBLD CKD-EPI 2021: 40 ML/MIN/1.73M*2
GLUCOSE SERPL-MCNC: 151 MG/DL (ref 74–99)
HDLC SERPL-MCNC: 36.7 MG/DL
LDLC SERPL CALC-MCNC: 72 MG/DL
NON HDL CHOLESTEROL: 133 MG/DL (ref 0–149)
POTASSIUM SERPL-SCNC: 4.5 MMOL/L (ref 3.5–5.3)
PROT SERPL-MCNC: 7.2 G/DL (ref 6.4–8.2)
SODIUM SERPL-SCNC: 141 MMOL/L (ref 136–145)
TRIGL SERPL-MCNC: 309 MG/DL (ref 0–149)
VLDL: 62 MG/DL (ref 0–40)

## 2025-01-23 PROCEDURE — 1159F MED LIST DOCD IN RCRD: CPT | Performed by: INTERNAL MEDICINE

## 2025-01-23 PROCEDURE — 99214 OFFICE O/P EST MOD 30 MIN: CPT | Performed by: INTERNAL MEDICINE

## 2025-01-23 PROCEDURE — 3078F DIAST BP <80 MM HG: CPT | Performed by: INTERNAL MEDICINE

## 2025-01-23 PROCEDURE — 1036F TOBACCO NON-USER: CPT | Performed by: INTERNAL MEDICINE

## 2025-01-23 PROCEDURE — 80053 COMPREHEN METABOLIC PANEL: CPT

## 2025-01-23 PROCEDURE — 80061 LIPID PANEL: CPT

## 2025-01-23 PROCEDURE — 82570 ASSAY OF URINE CREATININE: CPT

## 2025-01-23 PROCEDURE — 81001 URINALYSIS AUTO W/SCOPE: CPT

## 2025-01-23 PROCEDURE — 3075F SYST BP GE 130 - 139MM HG: CPT | Performed by: INTERNAL MEDICINE

## 2025-01-23 PROCEDURE — 82043 UR ALBUMIN QUANTITATIVE: CPT

## 2025-01-23 PROCEDURE — 83036 HEMOGLOBIN GLYCOSYLATED A1C: CPT

## 2025-01-23 RX ORDER — LISINOPRIL 40 MG/1
40 TABLET ORAL 2 TIMES DAILY
Qty: 180 TABLET | Refills: 3 | Status: SHIPPED | OUTPATIENT
Start: 2025-01-23 | End: 2026-01-23

## 2025-01-23 ASSESSMENT — PATIENT HEALTH QUESTIONNAIRE - PHQ9
SUM OF ALL RESPONSES TO PHQ9 QUESTIONS 1 AND 2: 0
2. FEELING DOWN, DEPRESSED OR HOPELESS: NOT AT ALL
1. LITTLE INTEREST OR PLEASURE IN DOING THINGS: NOT AT ALL

## 2025-01-23 ASSESSMENT — ENCOUNTER SYMPTOMS
VOMITING: 0
ALTERED MENTAL STATUS: 0
DEPRESSION: 0
WHEEZING: 0
HEMATURIA: 0
HEMOPTYSIS: 0
BLOATING: 0
ABDOMINAL PAIN: 0
DIARRHEA: 0
DYSURIA: 0
MYALGIAS: 0
MEMORY LOSS: 0
FEVER: 0
CONSTIPATION: 0
HEADACHES: 0
FALLS: 0
COUGH: 0
CHILLS: 0
NAUSEA: 0

## 2025-01-23 NOTE — PROGRESS NOTES
Chief Complaint   Patient presents with    Follow-up    Coronary Artery Disease    Hypertension       HPI  83 yo WM w/ h/o CAD s/p CABG x3v '00, parox SVT, HTN, HPL, TOB (quit ) now here for cardiology f/u. He is feeling good. No chest pain. No dyspnea unless climbing stairs. +long h/o rare palps/hot flash x seconds. No LH/dizziness/syncope. No further edema. No claudication. No cough.   BP by home nurse: ~110s/60s   ECG : SR (81)  ECG : SR (64), occ PVCs  HM  (48h): SR, HR  (avg 69), occ-freq PVCs (9%), SVTx5 (long 7 mins), palps x1 correlated w/ PVCs  Echo : EF 55%, DD, mild-mod MR  Echo : EF 55%, mild AI, mild MR, IVC 2.0  CT chest : no acute abnl, no peric eff, no aneurysm     Review of Systems   Constitutional: Negative for chills, fever and malaise/fatigue.   HENT:  Negative for hearing loss.    Eyes:  Negative for visual disturbance.   Respiratory:  Negative for cough, hemoptysis and wheezing.    Skin:  Negative for rash.   Musculoskeletal:  Negative for falls and myalgias.   Gastrointestinal:  Negative for bloating, abdominal pain, constipation, diarrhea, dysphagia, nausea and vomiting.   Genitourinary:  Negative for dysuria and hematuria.   Neurological:  Negative for headaches.   Psychiatric/Behavioral:  Negative for altered mental status, depression and memory loss.       Social History     Tobacco Use    Smoking status: Former     Current packs/day: 0.00     Average packs/day: 1 pack/day for 5.0 years (5.0 ttl pk-yrs)     Types: Cigarettes     Start date:      Quit date: 2020     Years since quittin.0     Passive exposure: Past    Smokeless tobacco: Never   Substance Use Topics    Alcohol use: Yes     Comment: socially      Family History   Problem Relation Name Age of Onset    Heart disease Father        Allergies   Allergen Reactions    Bee Venom Protein (Honey Bee) Unknown      Current Outpatient Medications   Medication Instructions    aspirin 81 mg, Daily  "   chlorthalidone (HYGROTON) 25 mg, oral, Daily    lisinopril 40 mg, oral, 2 times daily    lovastatin (MEVACOR) 40 mg, oral, Nightly    metoprolol tartrate (LOPRESSOR) 100 mg, oral, 2 times daily    multivitamin tablet 1 tablet, Daily    vit A,C and E-lutein-minerals (Ocuvite with Lutein) 300 mcg-200 mg-27 mg-2 mg tablet 1 tablet, Daily      Vitals:    01/23/25 1145   BP: 153/88   Pulse:    SpO2:       /79   Pulse 60   Ht 1.778 m (5' 10\")   Wt 76.7 kg (169 lb)   SpO2 100%   BMI 24.25 kg/m²       Physical Exam  Constitutional:       Appearance: Normal appearance.   HENT:      Head: Normocephalic and atraumatic.      Nose: Nose normal.   Cardiovascular:      Rate and Rhythm: Normal rate and regular rhythm. Extrasystoles are present.     Heart sounds: No murmur heard.  Pulmonary:      Effort: Pulmonary effort is normal.      Breath sounds: Normal breath sounds.   Abdominal:      Palpations: Abdomen is soft.      Tenderness: There is no abdominal tenderness.   Musculoskeletal:      Right lower leg: No edema.      Left lower leg: No edema.   Skin:     General: Skin is warm and dry.   Neurological:      General: No focal deficit present.      Mental Status: He is alert.   Psychiatric:         Mood and Affect: Mood normal.         Judgment: Judgment normal.        Results/Data  7/24 Cr 1.1, K 4.3, LFT nl, LDL 76, HDL 42, , Chol 149, HGB 12.6, , hgba1c 5.9  1/23 Cr 1.3, K 4.2, LFT nl, HGB 13.5, , hgba1c 5.8  7/22 cr 1.1, K 4.3, LFT nl, LDL 81, HDL 47, TG 99, Chol 148  3/22 Cr 1.1, K 4.6, LFT nl, HGB 11.5,   1/22 1.3, K 4.5, LFT nl, LDL 68, HDL 38, , Chol 131, hgba1c 6.4  7/21 Cr 1.1, K 4.3, LFT nl, LDL 65, HDL 48, , Chol 137, HGB 13, , hgba1c 5.4, TSH 1.22  8/20 Cr 1.2, K 4.4, LFT nl, LDL 69, HDL 53, TG 62, Chol 134, HGB 13.1, , hgba1c 5.6, TSH 0.69  2/20 Cr 1.2, K 4.7, LFT nl,LDL 65, HDL 55, TG 53, Chol 131, HGB 13.8, , hgba1c 5.6, TSH 1.23  7/19 Cr " 1.2, K 4.4, LFT nl, LDL 68, HDL 55, TG 52, Chol 133, hgba1c 5.5  1/19 Cr 1.1, K 4.2, LFT nl, LDL 63, HDL 49, TG 81, Chol 128, HGB 13.6, , TSH 0.94  1/18 hgba1c 5.9  11/17 Cr 1.21, K 3.9, Mg 2.28, LDL 57, HDL 38, , Chol 126, TSH 1.15  9/17 hgba1c 5.8     Assessment/Plan   83 yo WM w/ h/o CAD s/p CABG x3v '00, parox SVT, HTN, HPL, TOB (quit '75). Doing well. No further edema off Amlodipine and on Chlorthalidone. BP good. He is getting labs today.  -continue ASA 81 qd with food  -continue Metoprolol 100 bid (consider change to Carvedilol if needed for HTN)  -continue Lisinopril 40 bid  -continue Chlorthalidone 25 every day   -continue Lova 40 qhs (?GI upset on Atorva) -> goal LDL <70  -f/u 6 months (earlier if needed)     Alvin Regalado MD

## 2025-01-24 LAB
APPEARANCE UR: ABNORMAL
BILIRUB UR STRIP.AUTO-MCNC: NEGATIVE MG/DL
COLOR UR: ABNORMAL
CREAT UR-MCNC: 136.4 MG/DL (ref 20–370)
EST. AVERAGE GLUCOSE BLD GHB EST-MCNC: 128 MG/DL
GLUCOSE UR STRIP.AUTO-MCNC: NORMAL MG/DL
HBA1C MFR BLD: 6.1 %
KETONES UR STRIP.AUTO-MCNC: NEGATIVE MG/DL
LEUKOCYTE ESTERASE UR QL STRIP.AUTO: NEGATIVE
MICROALBUMIN UR-MCNC: 38.9 MG/L
MICROALBUMIN/CREAT UR: 28.5 UG/MG CREAT
NITRITE UR QL STRIP.AUTO: NEGATIVE
PH UR STRIP.AUTO: 5 [PH]
PROT UR STRIP.AUTO-MCNC: ABNORMAL MG/DL
RBC # UR STRIP.AUTO: NEGATIVE /UL
RBC #/AREA URNS AUTO: NORMAL /HPF
SP GR UR STRIP.AUTO: 1.02
UROBILINOGEN UR STRIP.AUTO-MCNC: NORMAL MG/DL
WBC #/AREA URNS AUTO: NORMAL /HPF

## 2025-01-30 ENCOUNTER — TELEPHONE (OUTPATIENT)
Dept: PRIMARY CARE | Facility: CLINIC | Age: 85
End: 2025-01-30

## 2025-01-30 ENCOUNTER — APPOINTMENT (OUTPATIENT)
Dept: PRIMARY CARE | Facility: CLINIC | Age: 85
End: 2025-01-30
Payer: MEDICARE

## 2025-01-30 VITALS
RESPIRATION RATE: 18 BRPM | WEIGHT: 160 LBS | HEART RATE: 61 BPM | BODY MASS INDEX: 22.96 KG/M2 | OXYGEN SATURATION: 99 % | SYSTOLIC BLOOD PRESSURE: 110 MMHG | DIASTOLIC BLOOD PRESSURE: 60 MMHG

## 2025-01-30 DIAGNOSIS — E78.00 PURE HYPERCHOLESTEROLEMIA: ICD-10-CM

## 2025-01-30 DIAGNOSIS — R73.03 PREDIABETES: ICD-10-CM

## 2025-01-30 DIAGNOSIS — E78.5 HYPERLIPIDEMIA, UNSPECIFIED HYPERLIPIDEMIA TYPE: ICD-10-CM

## 2025-01-30 DIAGNOSIS — I10 BENIGN ESSENTIAL HTN: Primary | ICD-10-CM

## 2025-01-30 DIAGNOSIS — I10 BENIGN ESSENTIAL HTN: ICD-10-CM

## 2025-01-30 DIAGNOSIS — Z79.899 MEDICATION MANAGEMENT: ICD-10-CM

## 2025-01-30 PROCEDURE — 1124F ACP DISCUSS-NO DSCNMKR DOCD: CPT | Performed by: FAMILY MEDICINE

## 2025-01-30 PROCEDURE — 99214 OFFICE O/P EST MOD 30 MIN: CPT | Performed by: FAMILY MEDICINE

## 2025-01-30 PROCEDURE — 1126F AMNT PAIN NOTED NONE PRSNT: CPT | Performed by: FAMILY MEDICINE

## 2025-01-30 PROCEDURE — 3078F DIAST BP <80 MM HG: CPT | Performed by: FAMILY MEDICINE

## 2025-01-30 PROCEDURE — 1159F MED LIST DOCD IN RCRD: CPT | Performed by: FAMILY MEDICINE

## 2025-01-30 PROCEDURE — 1036F TOBACCO NON-USER: CPT | Performed by: FAMILY MEDICINE

## 2025-01-30 PROCEDURE — 3074F SYST BP LT 130 MM HG: CPT | Performed by: FAMILY MEDICINE

## 2025-01-30 PROCEDURE — G2211 COMPLEX E/M VISIT ADD ON: HCPCS | Performed by: FAMILY MEDICINE

## 2025-01-30 ASSESSMENT — ENCOUNTER SYMPTOMS
OCCASIONAL FEELINGS OF UNSTEADINESS: 0
LOSS OF SENSATION IN FEET: 0
DEPRESSION: 0

## 2025-01-30 ASSESSMENT — PAIN SCALES - GENERAL: PAINLEVEL_OUTOF10: 0-NO PAIN

## 2025-01-30 NOTE — PROGRESS NOTES
Subjective   Patient ID: Olayinka Burnette is a 84 y.o. male who presents for Hypertension (Patient is here for a HTN check ), Hyperlipidemia (Patient is here for a CHOL check ), and Diabetes (Patient is here for a DM check ).    HPI    1. He is here for follow-up of high cholesterol.  He is on atorvastatin 20 mg.  Recent LDL is 76.      2. He is also here for follow-up of benign essential hypertension.  He is on lisinopril 80 mg, metoprolol succinate 100 mg and amlodipine 2.5 mg (added in 2019 by cardiologist).  He is followed by cardiologist (Dr. Regalado at ).      3.  He is also here for follow-up of diabetes type 2.  He is on no medication.   Recent A1c is 6.1.      4. He is also here for follow-up of coronary artery disease.   He is s/p CABG x3 in 2000. He also has paroxysmal supraventricular tachycardia.  He is followed by cardiologist Dr. Regalado.      Review of Systems  Denies headache, blurred vision, chest pain, shortness of breath, nausea or vomiting, change in bowel habits or leg pain or swelling.    Objective   /60 (BP Location: Left arm, Patient Position: Sitting, BP Cuff Size: Large adult)   Pulse 61   Resp 18   Wt 72.6 kg (160 lb)   SpO2 99%   BMI 22.96 kg/m²     Physical Exam  Vitals and nurse's notes reviewed  General: no acute distress  HEENT: Normal  Neck: Supple  Lungs: Clear  Cardio: RRR w/o murmur  Extremities: No edema, no calf tenderness  Neuro: Alert and oriented with no focal deficits    Assessment/Plan   Problem List Items Addressed This Visit             ICD-10-CM       High    Benign essential HTN - Primary I10     Continue lisinopril, metoprolol and amlodipine.  Recheck with me in 6 months.  Continue following with his cardiologist.         Relevant Orders    Basic metabolic panel    Hyperlipidemia E78.5     Continue atorvastatin.  Improve diet.  Recheck in 6 months at CPE.         Prediabetes R73.03     We long discussion regarding his diet.  He is very concerned that his A1c  is up to 6.1.  He will cut back on carbohydrates.  Will check the A1c again in 6 months.  Will also check BMP as his GFR and creatinine were slightly abnormal.  Encouraged him to drink increased fluids.  I will notify him results.  If stable we will see him back at 6 months.         Relevant Orders    Hemoglobin A1c

## 2025-01-30 NOTE — ASSESSMENT & PLAN NOTE
We long discussion regarding his diet.  He is very concerned that his A1c is up to 6.1.  He will cut back on carbohydrates.  Will check the A1c again in 6 months.  Will also check BMP as his GFR and creatinine were slightly abnormal.  Encouraged him to drink increased fluids.  I will notify him results.  If stable we will see him back at 6 months.

## 2025-01-30 NOTE — ASSESSMENT & PLAN NOTE
Continue lisinopril, metoprolol and amlodipine.  Recheck with me in 6 months.  Continue following with his cardiologist.

## 2025-03-03 ENCOUNTER — TELEPHONE (OUTPATIENT)
Dept: CARDIOLOGY | Facility: HOSPITAL | Age: 85
End: 2025-03-03
Payer: MEDICARE

## 2025-03-03 NOTE — TELEPHONE ENCOUNTER
Message passed on to pt and his wife. They verbalized understanding. Pt will monitor BP for the next few days and reach out again if BP remains low.

## 2025-03-31 ENCOUNTER — TELEPHONE (OUTPATIENT)
Dept: CARDIOLOGY | Facility: HOSPITAL | Age: 85
End: 2025-03-31

## 2025-03-31 NOTE — TELEPHONE ENCOUNTER
Spoke to patient and instructed him to hold his chlorthalidone.  Told patient to continue to check blood pressure readings and notify this office if blood pressure remains low or patient develops any lower extremity edema.  Patient verbalized understanding.

## 2025-03-31 NOTE — TELEPHONE ENCOUNTER
Spoke to patient.  He has decreased his lisinopril to 40 mg daily.  He remains on Metoprolol Tartrate 100 mg bid and Chlorthalidone 25 mg daily.  He said the last several BP readings have been in the 90's/50's.  He denies any symptoms of dizziness or lightheadedness.  Please advise.

## 2025-04-22 ENCOUNTER — APPOINTMENT (OUTPATIENT)
Dept: RADIOLOGY | Facility: HOSPITAL | Age: 85
End: 2025-04-22
Payer: MEDICARE

## 2025-04-22 ENCOUNTER — OFFICE VISIT (OUTPATIENT)
Dept: PRIMARY CARE | Facility: CLINIC | Age: 85
End: 2025-04-22
Payer: MEDICARE

## 2025-04-22 ENCOUNTER — TELEPHONE (OUTPATIENT)
Dept: PRIMARY CARE | Facility: CLINIC | Age: 85
End: 2025-04-22

## 2025-04-22 ENCOUNTER — HOSPITAL ENCOUNTER (EMERGENCY)
Facility: HOSPITAL | Age: 85
Discharge: HOME | End: 2025-04-23
Payer: MEDICARE

## 2025-04-22 VITALS
WEIGHT: 150 LBS | BODY MASS INDEX: 21.52 KG/M2 | HEART RATE: 85 BPM | SYSTOLIC BLOOD PRESSURE: 118 MMHG | TEMPERATURE: 97.8 F | DIASTOLIC BLOOD PRESSURE: 66 MMHG | OXYGEN SATURATION: 98 %

## 2025-04-22 DIAGNOSIS — K64.4 EXTERNAL HEMORRHOID: ICD-10-CM

## 2025-04-22 DIAGNOSIS — K62.5 BRIGHT RED BLOOD PER RECTUM: Primary | ICD-10-CM

## 2025-04-22 DIAGNOSIS — K57.31 DIVERTICULAR HEMORRHAGE: Primary | ICD-10-CM

## 2025-04-22 LAB
ABO GROUP (TYPE) IN BLOOD: NORMAL
ALBUMIN SERPL BCP-MCNC: 3.8 G/DL (ref 3.4–5)
ALP SERPL-CCNC: 31 U/L (ref 33–136)
ALT SERPL W P-5'-P-CCNC: 8 U/L (ref 10–52)
ANION GAP SERPL CALCULATED.3IONS-SCNC: 14 MMOL/L (ref 10–20)
ANTIBODY SCREEN: NORMAL
AST SERPL W P-5'-P-CCNC: 15 U/L (ref 9–39)
BASOPHILS # BLD AUTO: 0.04 X10*3/UL (ref 0–0.1)
BASOPHILS NFR BLD AUTO: 0.7 %
BILIRUB SERPL-MCNC: 0.5 MG/DL (ref 0–1.2)
BUN SERPL-MCNC: 21 MG/DL (ref 6–23)
CALCIUM SERPL-MCNC: 9 MG/DL (ref 8.6–10.3)
CHLORIDE SERPL-SCNC: 111 MMOL/L (ref 98–107)
CO2 SERPL-SCNC: 20 MMOL/L (ref 21–32)
CREAT SERPL-MCNC: 1.25 MG/DL (ref 0.5–1.3)
EGFRCR SERPLBLD CKD-EPI 2021: 56 ML/MIN/1.73M*2
EOSINOPHIL # BLD AUTO: 0.14 X10*3/UL (ref 0–0.4)
EOSINOPHIL NFR BLD AUTO: 2.5 %
ERYTHROCYTE [DISTWIDTH] IN BLOOD BY AUTOMATED COUNT: 15.3 % (ref 11.5–14.5)
GLUCOSE SERPL-MCNC: 139 MG/DL (ref 74–99)
HCT VFR BLD AUTO: 32.5 % (ref 41–52)
HEMOCCULT SP1 STL QL: POSITIVE
HGB BLD-MCNC: 10.2 G/DL (ref 13.5–17.5)
IMM GRANULOCYTES # BLD AUTO: 0.01 X10*3/UL (ref 0–0.5)
IMM GRANULOCYTES NFR BLD AUTO: 0.2 % (ref 0–0.9)
LIPASE SERPL-CCNC: 35 U/L (ref 9–82)
LYMPHOCYTES # BLD AUTO: 1.27 X10*3/UL (ref 0.8–3)
LYMPHOCYTES NFR BLD AUTO: 22.8 %
MAGNESIUM SERPL-MCNC: 2.3 MG/DL (ref 1.6–2.4)
MCH RBC QN AUTO: 30.5 PG (ref 26–34)
MCHC RBC AUTO-ENTMCNC: 31.4 G/DL (ref 32–36)
MCV RBC AUTO: 97 FL (ref 80–100)
MONOCYTES # BLD AUTO: 0.41 X10*3/UL (ref 0.05–0.8)
MONOCYTES NFR BLD AUTO: 7.3 %
NEUTROPHILS # BLD AUTO: 3.71 X10*3/UL (ref 1.6–5.5)
NEUTROPHILS NFR BLD AUTO: 66.5 %
NRBC BLD-RTO: 0 /100 WBCS (ref 0–0)
PLATELET # BLD AUTO: 161 X10*3/UL (ref 150–450)
POTASSIUM SERPL-SCNC: 3.8 MMOL/L (ref 3.5–5.3)
PROT SERPL-MCNC: 6.3 G/DL (ref 6.4–8.2)
RBC # BLD AUTO: 3.34 X10*6/UL (ref 4.5–5.9)
RH FACTOR (ANTIGEN D): NORMAL
SODIUM SERPL-SCNC: 141 MMOL/L (ref 136–145)
WBC # BLD AUTO: 5.6 X10*3/UL (ref 4.4–11.3)

## 2025-04-22 PROCEDURE — 36415 COLL VENOUS BLD VENIPUNCTURE: CPT | Performed by: PHYSICIAN ASSISTANT

## 2025-04-22 PROCEDURE — 1126F AMNT PAIN NOTED NONE PRSNT: CPT

## 2025-04-22 PROCEDURE — 86901 BLOOD TYPING SEROLOGIC RH(D): CPT | Performed by: PHYSICIAN ASSISTANT

## 2025-04-22 PROCEDURE — 1036F TOBACCO NON-USER: CPT

## 2025-04-22 PROCEDURE — 1160F RVW MEDS BY RX/DR IN RCRD: CPT

## 2025-04-22 PROCEDURE — 3074F SYST BP LT 130 MM HG: CPT

## 2025-04-22 PROCEDURE — 82270 OCCULT BLOOD FECES: CPT | Performed by: PHYSICIAN ASSISTANT

## 2025-04-22 PROCEDURE — 3078F DIAST BP <80 MM HG: CPT

## 2025-04-22 PROCEDURE — 1159F MED LIST DOCD IN RCRD: CPT

## 2025-04-22 PROCEDURE — 74177 CT ABD & PELVIS W/CONTRAST: CPT

## 2025-04-22 PROCEDURE — 99212 OFFICE O/P EST SF 10 MIN: CPT

## 2025-04-22 PROCEDURE — 80053 COMPREHEN METABOLIC PANEL: CPT | Performed by: PHYSICIAN ASSISTANT

## 2025-04-22 PROCEDURE — 1124F ACP DISCUSS-NO DSCNMKR DOCD: CPT

## 2025-04-22 PROCEDURE — 99285 EMERGENCY DEPT VISIT HI MDM: CPT | Mod: 25

## 2025-04-22 PROCEDURE — 83690 ASSAY OF LIPASE: CPT | Performed by: PHYSICIAN ASSISTANT

## 2025-04-22 PROCEDURE — 83735 ASSAY OF MAGNESIUM: CPT | Performed by: PHYSICIAN ASSISTANT

## 2025-04-22 PROCEDURE — 85025 COMPLETE CBC W/AUTO DIFF WBC: CPT | Performed by: PHYSICIAN ASSISTANT

## 2025-04-22 PROCEDURE — 2550000001 HC RX 255 CONTRASTS: Performed by: PHYSICIAN ASSISTANT

## 2025-04-22 RX ORDER — HYDROCORTISONE 25 MG/G
1 CREAM TOPICAL 2 TIMES DAILY
Qty: 6 G | Refills: 0 | Status: SHIPPED | OUTPATIENT
Start: 2025-04-22 | End: 2025-05-22

## 2025-04-22 RX ADMIN — IOHEXOL 75 ML: 350 INJECTION, SOLUTION INTRAVENOUS at 22:04

## 2025-04-22 ASSESSMENT — ENCOUNTER SYMPTOMS
ROS GI COMMENTS: BRIGHT RED BLOOD PER RECTUM
RECTAL PAIN: 0
SHORTNESS OF BREATH: 0
DIARRHEA: 0
LIGHT-HEADEDNESS: 0
NAUSEA: 0
ABDOMINAL PAIN: 0
CONSTIPATION: 0
DIFFICULTY URINATING: 0
FEVER: 0
BACK PAIN: 0
HEADACHES: 0
CHILLS: 0
DIZZINESS: 0
VOMITING: 0

## 2025-04-22 ASSESSMENT — COLUMBIA-SUICIDE SEVERITY RATING SCALE - C-SSRS
1. IN THE PAST MONTH, HAVE YOU WISHED YOU WERE DEAD OR WISHED YOU COULD GO TO SLEEP AND NOT WAKE UP?: NO
2. HAVE YOU ACTUALLY HAD ANY THOUGHTS OF KILLING YOURSELF?: NO
6. HAVE YOU EVER DONE ANYTHING, STARTED TO DO ANYTHING, OR PREPARED TO DO ANYTHING TO END YOUR LIFE?: NO

## 2025-04-22 ASSESSMENT — PAIN - FUNCTIONAL ASSESSMENT: PAIN_FUNCTIONAL_ASSESSMENT: 0-10

## 2025-04-22 ASSESSMENT — PAIN SCALES - GENERAL
PAINLEVEL_OUTOF10: 0-NO PAIN
PAINLEVEL_OUTOF10: 0 - NO PAIN

## 2025-04-22 NOTE — PROGRESS NOTES
Subjective   Patient ID: Olayinka Burnette is a 84 y.o. male who presents for Diverticulitis (X this AM/Denies any pain).    Olayinka is an 85 yo male presenting with hematochezia x1day. Patient states he has had 3-4 toilet bowls full of raquel blood today - states it is a large amount each time. He has a history of diverticulosis and diverticular bleeds in 2007 and 2010, he also has a history of anemia.  His last colonoscopy was 04/2022 with a Dr martínez -showed advanced diverticulosis and internal hemorrhoids.   Patient denies and abdominal pain. Denies nausea or vomiting. Has not had any dizziness or light headedness.       Patient Care Team:  Min Spann MD as PCP - General  Min Spann MD as PCP - [employer] PCP    PMH, PSH, family history and social history were reviewed and updated.    Review of Systems   Constitutional:  Negative for chills and fever.   HENT: Negative.     Respiratory:  Negative for shortness of breath.    Cardiovascular:  Negative for chest pain.   Gastrointestinal:  Negative for abdominal pain, constipation, diarrhea, nausea, rectal pain and vomiting.        Bright red blood per rectum   Genitourinary:  Negative for difficulty urinating.   Musculoskeletal:  Negative for back pain.   Neurological:  Negative for dizziness, light-headedness and headaches.       Objective   /66   Pulse 85   Temp 36.6 °C (97.8 °F)   Wt 68 kg (150 lb)   SpO2 98%   BMI 21.52 kg/m²     Physical Exam  Vitals and nursing note reviewed.   Constitutional:       General: He is not in acute distress.     Appearance: Normal appearance. He is not ill-appearing.   Cardiovascular:      Rate and Rhythm: Normal rate and regular rhythm.      Heart sounds: Normal heart sounds.   Pulmonary:      Effort: Pulmonary effort is normal. No respiratory distress.      Breath sounds: Normal breath sounds. No stridor. No wheezing, rhonchi or rales.   Chest:      Chest wall: No tenderness.   Abdominal:       General: Abdomen is flat.      Palpations: Abdomen is soft.   Musculoskeletal:      Cervical back: Normal range of motion and neck supple.   Skin:     General: Skin is warm and dry.   Neurological:      Mental Status: He is alert and oriented to person, place, and time.   Psychiatric:         Mood and Affect: Mood normal.         Behavior: Behavior normal.         Assessment/Plan   Assessment & Plan  Diverticular hemorrhage    Assuming diverticular bleed due to lack of abdominal pain or systemic symptoms     Due to patient's age, history of anemia and the amount of blood I am hesitant to send him home.     Advised going to ER for management. He may need blood work, rehydration and to be seen by GI on staff there. Patient verbalized understanding and will go to ER.     Patient states he has his son in law with him who will drive him. Patient is stable for transferring in a car.

## 2025-04-22 NOTE — TELEPHONE ENCOUNTER
pt is on the phone, JAT today he started with a diverticulitis flare up he has a lot of red blood in the toilet, no fever, nausea, vomiting or pain wants to know if he should come in or go to the ER      EMILEE Santos MD  He can be seen here.      MM  Scheduled appointment

## 2025-04-22 NOTE — ED PROVIDER NOTES
HPI   Chief Complaint   Patient presents with    Black or Bloody Stool     Pt complains of bloody stool x3-4 starting this morning.  Denies abd pain.  Feels a little lightheaded.        HPI  84-year-old male coming in for evaluation of bright red blood per rectum for the last 3 days.  Denies any abdominal pain, history of diverticulosis, seen today advised to come to the emergency department for assessment.      Patient History   Medical History[1]  Surgical History[2]  Family History[3]  Social History[4]    Physical Exam   ED Triage Vitals [04/22/25 1724]   Temperature Heart Rate Respirations BP   36.9 °C (98.4 °F) 80 16 105/63      Pulse Ox Temp src Heart Rate Source Patient Position   100 % -- -- --      BP Location FiO2 (%)     -- --       Physical Exam  PHYSICAL EXAMINATION    GENERAL APPEARANCE: Awake and alert.     VITAL SIGNS: As per the nurses' triage record.     HEENT: Normocephalic, atraumatic. Extraocular muscles are intact. Pupils equal round and reactive to light. Conjunctiva are pink. Negative scleral icterus. Mucous membranes are moist. Tongue in the midline. Pharynx was without erythema or exudates, uvula midline    NECK: Soft Nontender and supple, full gross ROM, no meningeal signs.    CHEST: Nontender to palpation. Clear to auscultation bilaterally. No rales, rhonchi, or wheezing.     HEART: S1, S2. Regular rate and rhythm. No murmurs, gallops or rubs.  Strong and equal pulses in the extremities.     ABDOMEN: Soft, nontender, nondistended, positive bowel sounds, no palpable masses.    MUSCULOSKELETAL: The calves are nontender to palpation. Full gross active range of motion. Ambulating on own with no acute difficulties    Rectal: Rectal examination performed, single uncomplicated nonthrombosed hemorrhoid at approximately 1:00 externally.  Digital examination reflects no significant abnormality.  Occult blood sent for testing     NEUROLOGICAL: Awake, alert and oriented x 3. Power intact in the  upper and lower extremities. Sensation is intact to light touch in the upper and lower extremities.     IMMUNOLOGICAL: No lymphatic streaking noted     DERM: No petechiae, rashes, or ecchymoses.    ED Course & MDM   ED Course as of 04/22/25 2358 Tue Apr 22, 2025 2355 Went over all findings with the patient, he feels comfortable with home-going plan. [AP]      ED Course User Index  [AP] Cristóbal Armstrong PA-C         Diagnoses as of 04/22/25 2358   Bright red blood per rectum   External hemorrhoid                 No data recorded     [place] Coma Scale Score: 15 (04/22/25 1731 : Analy Moran, MONO)                           Medical Decision Making  Parts of this chart have been completed using voice recognition software. Please excuse any errors of transcription.  My thought process and reason for plan has been formulated from the time that I saw the patient until the time of disposition and is not specific to one specific moment during their visit and furthermore my MDM encompasses this entire chart and not only this text box.      HPI: Detailed above.    Exam: A medically appropriate exam performed, outlined above, given the known history and presentation.    History Limited by: Nothing    History obtained from: The    External/internal records reviewed: Reviewed office visit from earlier today 4/22/2025 for rectal bleeding    Social Determinants of Health considered during this visit: Lives at home    Chronic conditions impacting care: Denies    Medications given during visit:  Medications   iohexol (OMNIPaque) 350 mg iodine/mL solution 75 mL (75 mL intravenous Given 4/22/25 2204)        Diagnostic/tests  Labs Reviewed   OCCULT BLOOD X1, STOOL - Abnormal       Result Value    Occult Blood, Stool X1 Positive (*)    CBC WITH AUTO DIFFERENTIAL - Abnormal    WBC 5.6      nRBC 0.0      RBC 3.34 (*)     Hemoglobin 10.2 (*)     Hematocrit 32.5 (*)     MCV 97      MCH 30.5      MCHC 31.4 (*)     RDW 15.3 (*)      Platelets 161      Neutrophils % 66.5      Immature Granulocytes %, Automated 0.2      Lymphocytes % 22.8      Monocytes % 7.3      Eosinophils % 2.5      Basophils % 0.7      Neutrophils Absolute 3.71      Immature Granulocytes Absolute, Automated 0.01      Lymphocytes Absolute 1.27      Monocytes Absolute 0.41      Eosinophils Absolute 0.14      Basophils Absolute 0.04     COMPREHENSIVE METABOLIC PANEL - Abnormal    Glucose 139 (*)     Sodium 141      Potassium 3.8      Chloride 111 (*)     Bicarbonate 20 (*)     Anion Gap 14      Urea Nitrogen 21      Creatinine 1.25      eGFR 56 (*)     Calcium 9.0      Albumin 3.8      Alkaline Phosphatase 31 (*)     Total Protein 6.3 (*)     AST 15      Bilirubin, Total 0.5      ALT 8 (*)    LIPASE - Normal    Lipase 35      Narrative:     Venipuncture immediately after or during the administration of Metamizole may lead to falsely low results. Testing should be performed immediately prior to Metamizole dosing.   MAGNESIUM - Normal    Magnesium 2.30     TYPE AND SCREEN    ABO TYPE A      Rh TYPE POS      ANTIBODY SCREEN NEG        CT abdomen pelvis w IV contrast   Final Result   1. Rectum appears unremarkable in appearance, without significant   wall thickening, although the study is not optimized to assess it.   2. Numerous diverticula are present throughout the distal descending   and sigmoid colon without evidence of acute diverticulitis.   3. Several radiopaque stones are present in the dependent portion of   the bladder, with similar appearance of the bladder diverticula to   prior exam in 2022. No bladder wall thickening is identified.   4. Marked prostatomegaly. Correlate with serum PSA.        MACRO:   None.        Signed by: Amie Guerin 4/22/2025 11:37 PM   Dictation workstation:   [id number]               Considerations/further MDM:  Differential diagnosis includes acute blood loss, GI bleed, hemorrhoid, fissure, fistula, acute abdomen, internal  bleeding.    The patient has not external nonthrombosed hemorrhoid, could be contributing to some of the bleeding but does not appear to be actively bleeding at this time.  The patient's hemoglobin dropped slightly from previous draw but still within a reasonable limit.  The patient will be advised return precautions follow-up instructions and GI follow-up,    The patient says that this happened 2 previous times and that was the same recommendation on previous episodes.  Both previous episodes have been years ago.    Patient has a  standing of return precaution follow-up instructions.      Procedure  Procedures         [1]   Past Medical History:  Diagnosis Date    Personal history of other diseases of the circulatory system     History of cardiac disorder   [2]   Past Surgical History:  Procedure Laterality Date    CARDIAC SURGERY  2016    Heart Surgery    CORONARY ARTERY BYPASS GRAFT  2018    CABG    KNEE SURGERY  2016    Knee Surgery    OTHER SURGICAL HISTORY  2016    Repair Of Humerus / Arm   [3]   Family History  Problem Relation Name Age of Onset    Heart disease Father     [4]   Social History  Tobacco Use    Smoking status: Former     Current packs/day: 0.00     Average packs/day: 1 pack/day for 5.0 years (5.0 ttl pk-yrs)     Types: Cigarettes     Start date:      Quit date:      Years since quittin.3     Passive exposure: Past    Smokeless tobacco: Never   Vaping Use    Vaping status: Never Used   Substance Use Topics    Alcohol use: Yes     Comment: socially    Drug use: Never        Cristóbal Armstrong PA-C  [id number]

## 2025-04-23 VITALS
SYSTOLIC BLOOD PRESSURE: 134 MMHG | WEIGHT: 150 LBS | HEIGHT: 70 IN | DIASTOLIC BLOOD PRESSURE: 73 MMHG | HEART RATE: 80 BPM | OXYGEN SATURATION: 97 % | TEMPERATURE: 98.4 F | BODY MASS INDEX: 21.47 KG/M2 | RESPIRATION RATE: 20 BRPM

## 2025-04-23 NOTE — DISCHARGE INSTRUCTIONS
"Follow-up with gastroenterology you may need another colonoscopy for evaluation of your symptoms    Thank you for allowing us to take care of you today. While you are home, you might receive a survey about your care in our hospital. Your nurse and myself, Cristóbal Armstrong PA-C, would love your honest feedback on how we can improve your care. Your feedback and especially your positive comments help our hospital receive the support we need to continue to serve you and your family. Thank you again for trusting us with your care.\"    Be sure to follow up as directed in 1-2 days.  All of the details of your follow up instructions are detailed in the follow up section of this packet.         It is important to remember that your care does not end here and you must continue to monitor your condition closely. Please return to the emergency department for any worsening or concerning signs or symptoms as directed by our conversations and the discharge instructions. Otherwise please follow up with your doctor in 2 days if no better or worse. If you do not have a doctor please contact the referral number on your discharge instructions. Please contact any physician specialists provided in your discharge notes as it is very important to follow up with them regarding your condition. If you are unable to reach the physicians provided, please come back to the Emergency Department at any time.        Return to emergency room without delay for ANY new or worsening pains or for any other symptoms or concerns.      "

## 2025-04-30 DIAGNOSIS — I10 BENIGN ESSENTIAL HTN: ICD-10-CM

## 2025-04-30 DIAGNOSIS — R73.03 PREDIABETES: ICD-10-CM

## 2025-05-14 LAB
ANION GAP SERPL CALCULATED.4IONS-SCNC: 13 MMOL/L (CALC) (ref 7–17)
BUN SERPL-MCNC: 24 MG/DL (ref 7–25)
BUN/CREAT SERPL: NORMAL (CALC) (ref 6–22)
CALCIUM SERPL-MCNC: 9.4 MG/DL (ref 8.6–10.3)
CHLORIDE SERPL-SCNC: 110 MMOL/L (ref 98–110)
CO2 SERPL-SCNC: 21 MMOL/L (ref 20–32)
CREAT SERPL-MCNC: 1.11 MG/DL (ref 0.7–1.22)
EGFRCR SERPLBLD CKD-EPI 2021: 65 ML/MIN/1.73M2
GLUCOSE SERPL-MCNC: 97 MG/DL (ref 65–99)
HBA1C MFR BLD: 5.1 %
POTASSIUM SERPL-SCNC: 4.5 MMOL/L (ref 3.5–5.3)
SODIUM SERPL-SCNC: 144 MMOL/L (ref 135–146)

## 2025-06-16 ENCOUNTER — TELEPHONE (OUTPATIENT)
Dept: CARDIOLOGY | Facility: HOSPITAL | Age: 85
End: 2025-06-16

## 2025-06-16 DIAGNOSIS — I10 HYPERTENSION, UNSPECIFIED TYPE: ICD-10-CM

## 2025-06-16 RX ORDER — LISINOPRIL 40 MG/1
40 TABLET ORAL 2 TIMES DAILY
Qty: 180 TABLET | Refills: 3 | Status: SHIPPED | OUTPATIENT
Start: 2025-06-16 | End: 2026-06-16

## 2025-07-09 ENCOUNTER — OFFICE VISIT (OUTPATIENT)
Dept: PRIMARY CARE | Facility: CLINIC | Age: 85
End: 2025-07-09
Payer: MEDICARE

## 2025-07-09 VITALS
OXYGEN SATURATION: 98 % | SYSTOLIC BLOOD PRESSURE: 120 MMHG | BODY MASS INDEX: 20.95 KG/M2 | DIASTOLIC BLOOD PRESSURE: 70 MMHG | RESPIRATION RATE: 18 BRPM | HEART RATE: 62 BPM | WEIGHT: 146 LBS | TEMPERATURE: 98.1 F

## 2025-07-09 DIAGNOSIS — R21 RASH: Primary | ICD-10-CM

## 2025-07-09 PROCEDURE — 1159F MED LIST DOCD IN RCRD: CPT

## 2025-07-09 PROCEDURE — 1036F TOBACCO NON-USER: CPT

## 2025-07-09 PROCEDURE — 1126F AMNT PAIN NOTED NONE PRSNT: CPT

## 2025-07-09 PROCEDURE — G2211 COMPLEX E/M VISIT ADD ON: HCPCS

## 2025-07-09 PROCEDURE — 3074F SYST BP LT 130 MM HG: CPT

## 2025-07-09 PROCEDURE — 3078F DIAST BP <80 MM HG: CPT

## 2025-07-09 PROCEDURE — 99212 OFFICE O/P EST SF 10 MIN: CPT

## 2025-07-09 ASSESSMENT — PAIN SCALES - GENERAL: PAINLEVEL_OUTOF10: 0-NO PAIN

## 2025-07-09 NOTE — PROGRESS NOTES
Subjective   Patient ID: Olayinka Burnette is a 84 y.o. male who presents for Rash (Patient is here for a rash on his hands and arms off and on 3-4 weeks).    Olayinka is an 84-year-old male presenting with concerns for rash on his fingers and arm for the last 3 to 4 weeks.  He reports they come and go and are grouped vesicles on his fingers with 1 satellite lesion on his left forearm.  He denies any itchiness.  He reports there is mild pain associated with them, sometimes prior to when they appear.  They occasionally unroofed and weep serosanguineous fluid.  He denies any sick symptoms or fevers.  He denies any contact with plants or any new toiletries.  No person in his household has similar symptoms.      Patient Care Team:  Min Spann MD as PCP - General  Min Spann MD as PCP - [employer] PCP    PMH, PSH, family history and social history were reviewed and updated.    Review of Systems  All other systems have been reviewed and are negative except as noted in the HPI.     Objective   /70 (BP Location: Left arm, Patient Position: Sitting, BP Cuff Size: Large adult)   Pulse 62   Temp 36.7 °C (98.1 °F) (Temporal)   Resp 18   Wt 66.2 kg (146 lb)   SpO2 98%   BMI 20.95 kg/m²     Physical Exam  Vitals and nursing note reviewed.   Constitutional:       General: He is not in acute distress.     Appearance: Normal appearance. He is not ill-appearing.   Musculoskeletal:         General: No swelling or tenderness.   Skin:     Capillary Refill: Capillary refill takes less than 2 seconds.      Findings: Rash (Small grouped vesicular lesions on distal palmar surface of right 5th and 4th finger, small nonweeping, not very fluctuant/mobile.  Also located on posterior surface of right pointer finger.  Located on left thumb and left anterior  forearm as well.) present. No bruising or erythema.   Neurological:      Mental Status: He is alert and oriented to person, place, and time.      Sensory:  No sensory deficit.      Motor: No weakness.   Psychiatric:         Mood and Affect: Mood normal.         Behavior: Behavior normal.         Assessment/Plan   Assessment & Plan  Rash       Rash looks very similar to herpetic flex and is mainly concentrated on fingers.  He does have a few satellite lesions on left forearm.   This is also supported by the fact that the vesicles come and go and are usually preceded by some pain.    Will monitor for now, may use hydrocortisone cream if itching.  Follow-up if any fever or systemic symptoms appear  May possibly be contagious if weeping vesicles, advised using gloves if vesicles are open.  Follow-up if spreading or not improving in a couple of weeks.    Follow up as scheduled, sooner with any problems or concerns.

## 2025-07-24 ENCOUNTER — OFFICE VISIT (OUTPATIENT)
Dept: CARDIOLOGY | Facility: CLINIC | Age: 85
End: 2025-07-24
Payer: MEDICARE

## 2025-07-24 VITALS
BODY MASS INDEX: 21.09 KG/M2 | DIASTOLIC BLOOD PRESSURE: 71 MMHG | WEIGHT: 147 LBS | SYSTOLIC BLOOD PRESSURE: 140 MMHG | OXYGEN SATURATION: 99 % | HEART RATE: 55 BPM

## 2025-07-24 DIAGNOSIS — I25.10 CORONARY ARTERY DISEASE INVOLVING NATIVE CORONARY ARTERY OF NATIVE HEART WITHOUT ANGINA PECTORIS: ICD-10-CM

## 2025-07-24 DIAGNOSIS — I10 BENIGN ESSENTIAL HTN: Primary | ICD-10-CM

## 2025-07-24 LAB
ATRIAL RATE: 54 BPM
P AXIS: 15 DEGREES
P OFFSET: 200 MS
P ONSET: 137 MS
PR INTERVAL: 172 MS
Q ONSET: 223 MS
QRS COUNT: 9 BEATS
QRS DURATION: 84 MS
QT INTERVAL: 454 MS
QTC CALCULATION(BAZETT): 430 MS
QTC FREDERICIA: 438 MS
R AXIS: 44 DEGREES
T AXIS: 20 DEGREES
T OFFSET: 450 MS
VENTRICULAR RATE: 54 BPM

## 2025-07-24 PROCEDURE — 3078F DIAST BP <80 MM HG: CPT | Performed by: INTERNAL MEDICINE

## 2025-07-24 PROCEDURE — 99212 OFFICE O/P EST SF 10 MIN: CPT

## 2025-07-24 PROCEDURE — 99214 OFFICE O/P EST MOD 30 MIN: CPT | Performed by: INTERNAL MEDICINE

## 2025-07-24 PROCEDURE — 3077F SYST BP >= 140 MM HG: CPT | Performed by: INTERNAL MEDICINE

## 2025-07-24 PROCEDURE — 1159F MED LIST DOCD IN RCRD: CPT | Performed by: INTERNAL MEDICINE

## 2025-07-24 PROCEDURE — 93005 ELECTROCARDIOGRAM TRACING: CPT | Performed by: INTERNAL MEDICINE

## 2025-07-24 ASSESSMENT — ENCOUNTER SYMPTOMS
LOSS OF SENSATION IN FEET: 0
FALLS: 0
MYALGIAS: 0
ABDOMINAL PAIN: 0
COUGH: 0
DEPRESSION: 0
FEVER: 0
NAUSEA: 0
ALTERED MENTAL STATUS: 0
MEMORY LOSS: 0
CHILLS: 0
HEADACHES: 0
DYSURIA: 0
VOMITING: 0
HEMATURIA: 0
OCCASIONAL FEELINGS OF UNSTEADINESS: 0
CONSTIPATION: 0
DIARRHEA: 0
WHEEZING: 0
HEMOPTYSIS: 0
BLOATING: 0

## 2025-07-24 NOTE — PROGRESS NOTES
Chief Complaint   Patient presents with    Follow-up    Coronary Artery Disease    Hypertension        HPI  83 yo WM w/ h/o CAD s/p CABG x3v '00, parox SVT, HTN, HPL, TOB (quit ) now here for cardiology f/u. He is feeling good. No chest pain. No dyspnea unless climbing stairs. +long h/o rare palps/hot flash x seconds. No LH/dizziness/syncope. No further edema. No claudication. No cough. +rare BRBPR  BP by home nurse (wife): 90s-100s/50s-60s   ECG : SR (81)  ECG : SR (64), occ PVCs  ECG : SB (54)  HM  (48h): SR, HR  (avg 69), occ-freq PVCs (9%), SVTx5 (long 7 mins), palps x1 correlated w/ PVCs  Echo : EF 55%, DD, mild-mod MR  Echo : EF 55%, mild AI, mild MR, IVC 2.0  CT chest : no acute abnl, no peric eff, no aneurysm   CT ab : AA, no AAA, nl heart size, no peric eff    Review of Systems   Constitutional: Negative for chills, fever and malaise/fatigue.   HENT:  Negative for hearing loss.    Eyes:  Negative for visual disturbance.   Respiratory:  Negative for cough, hemoptysis and wheezing.    Skin:  Negative for rash.   Musculoskeletal:  Negative for falls and myalgias.   Gastrointestinal:  Negative for bloating, abdominal pain, constipation, diarrhea, dysphagia, nausea and vomiting.   Genitourinary:  Negative for dysuria and hematuria.   Neurological:  Negative for headaches.   Psychiatric/Behavioral:  Negative for altered mental status, depression and memory loss.       Social History     Tobacco Use    Smoking status: Former     Current packs/day: 0.00     Average packs/day: 1 pack/day for 5.0 years (5.0 ttl pk-yrs)     Types: Cigarettes     Start date:      Quit date:      Years since quittin.5     Passive exposure: Past    Smokeless tobacco: Never   Substance Use Topics    Alcohol use: Yes     Comment: socially      Family History   Problem Relation Name Age of Onset    Heart disease Father        Allergies   Allergen Reactions    Bee Venom Protein (Honey  Adriana) Unknown      Current Outpatient Medications   Medication Instructions    aspirin 81 mg, Daily    chlorthalidone (HYGROTON) 25 mg, oral, Daily    hydrocortisone (Anusol-HC) 2.5 % rectal cream 1 Application, rectal, 2 times daily, Apply rectally 2 times daily    lisinopril 40 mg, oral, 2 times daily    lovastatin (MEVACOR) 40 mg, oral, Nightly    metoprolol tartrate (LOPRESSOR) 100 mg, oral, 2 times daily    multivitamin tablet 1 tablet, Daily    vit A,C and E-lutein-minerals (Ocuvite with Lutein) 300 mcg-200 mg-27 mg-2 mg tablet 1 tablet, Daily      /71 (BP Location: Left arm, Patient Position: Sitting, BP Cuff Size: Adult)   Pulse 55   Wt 66.7 kg (147 lb)   SpO2 99%   BMI 21.09 kg/m²       Physical Exam  Constitutional:       Appearance: Normal appearance.   HENT:      Head: Normocephalic and atraumatic.      Nose: Nose normal.     Cardiovascular:      Rate and Rhythm: Normal rate and regular rhythm. Extrasystoles are present.     Heart sounds: No murmur heard.     Comments: Tr pitting LE edema  Pulmonary:      Effort: Pulmonary effort is normal.      Breath sounds: Normal breath sounds.   Abdominal:      Palpations: Abdomen is soft.      Tenderness: There is no abdominal tenderness.     Musculoskeletal:      Right lower leg: Edema present.      Left lower leg: Edema present.     Skin:     General: Skin is warm and dry.     Neurological:      General: No focal deficit present.      Mental Status: He is alert.     Psychiatric:         Mood and Affect: Mood normal.         Judgment: Judgment normal.        Results/Data  5/25 Cr 1.11, K 4.5, hgba1c 5.1  4/25 Cr 1.25, K 3.8, Mg 2.3, HGB 10.2,   1/25 Cr 1.65, K 4.5, LDL 72, HDL 37, , Chol 170  7/24 Cr 1.1, K 4.3, LFT nl, LDL 76, HDL 42, , Chol 149, HGB 12.6, , hgba1c 5.9  1/23 Cr 1.3, K 4.2, LFT nl, HGB 13.5, , hgba1c 5.8  7/22 cr 1.1, K 4.3, LFT nl, LDL 81, HDL 47, TG 99, Chol 148  3/22 Cr 1.1, K 4.6, LFT nl, HGB 11.5, PLT  172  1/22 1.3, K 4.5, LFT nl, LDL 68, HDL 38, , Chol 131, hgba1c 6.4  7/21 Cr 1.1, K 4.3, LFT nl, LDL 65, HDL 48, , Chol 137, HGB 13, , hgba1c 5.4, TSH 1.22  8/20 Cr 1.2, K 4.4, LFT nl, LDL 69, HDL 53, TG 62, Chol 134, HGB 13.1, , hgba1c 5.6, TSH 0.69  2/20 Cr 1.2, K 4.7, LFT nl,LDL 65, HDL 55, TG 53, Chol 131, HGB 13.8, , hgba1c 5.6, TSH 1.23  7/19 Cr 1.2, K 4.4, LFT nl, LDL 68, HDL 55, TG 52, Chol 133, hgba1c 5.5  1/19 Cr 1.1, K 4.2, LFT nl, LDL 63, HDL 49, TG 81, Chol 128, HGB 13.6, , TSH 0.94  1/18 hgba1c 5.9  11/17 Cr 1.21, K 3.9, Mg 2.28, LDL 57, HDL 38, , Chol 126, TSH 1.15  9/17 hgba1c 5.8     Assessment/Plan   86 yo WM w/ h/o CAD s/p CABG x3v '00, parox SVT, HTN, HPL, TOB (quit '75). Doing well. BP good at home.   -continue ASA 81 qd with food  -continue Metoprolol 100 bid (consider change to Carvedilol if needed for HTN)  -continue Lisinopril 40 bid  -continue Chlorthalidone 25 every day   -continue Lova 40 qhs (?GI upset on Atorva) -> goal LDL <70  -f/u 6 months (earlier if needed)     Alvin Regalado MD

## 2025-07-27 ENCOUNTER — HOSPITAL ENCOUNTER (EMERGENCY)
Facility: HOSPITAL | Age: 85
Discharge: OTHER INSTITUTION | End: 2025-07-27
Payer: MEDICARE

## 2025-07-27 ENCOUNTER — APPOINTMENT (OUTPATIENT)
Dept: RADIOLOGY | Facility: HOSPITAL | Age: 85
End: 2025-07-27
Payer: MEDICARE

## 2025-07-27 ENCOUNTER — HOSPITAL ENCOUNTER (EMERGENCY)
Facility: HOSPITAL | Age: 85
Discharge: HOME | End: 2025-07-28
Attending: EMERGENCY MEDICINE
Payer: MEDICARE

## 2025-07-27 VITALS
OXYGEN SATURATION: 100 % | WEIGHT: 145 LBS | HEART RATE: 70 BPM | BODY MASS INDEX: 20.76 KG/M2 | DIASTOLIC BLOOD PRESSURE: 84 MMHG | RESPIRATION RATE: 15 BRPM | HEIGHT: 70 IN | TEMPERATURE: 98.1 F | SYSTOLIC BLOOD PRESSURE: 163 MMHG

## 2025-07-27 DIAGNOSIS — M25.511 ACUTE PAIN OF RIGHT SHOULDER: Primary | ICD-10-CM

## 2025-07-27 PROCEDURE — 4500999001 HC ED NO CHARGE

## 2025-07-27 PROCEDURE — 2500000001 HC RX 250 WO HCPCS SELF ADMINISTERED DRUGS (ALT 637 FOR MEDICARE OP): Performed by: EMERGENCY MEDICINE

## 2025-07-27 PROCEDURE — 73030 X-RAY EXAM OF SHOULDER: CPT | Mod: RIGHT SIDE | Performed by: STUDENT IN AN ORGANIZED HEALTH CARE EDUCATION/TRAINING PROGRAM

## 2025-07-27 PROCEDURE — 99283 EMERGENCY DEPT VISIT LOW MDM: CPT | Performed by: EMERGENCY MEDICINE

## 2025-07-27 PROCEDURE — 73030 X-RAY EXAM OF SHOULDER: CPT | Mod: RT

## 2025-07-27 RX ORDER — ACETAMINOPHEN 325 MG/1
975 TABLET ORAL ONCE
Status: COMPLETED | OUTPATIENT
Start: 2025-07-27 | End: 2025-07-27

## 2025-07-27 RX ADMIN — ACETAMINOPHEN 975 MG: 325 TABLET ORAL at 23:53

## 2025-07-27 ASSESSMENT — PAIN DESCRIPTION - PAIN TYPE: TYPE: ACUTE PAIN

## 2025-07-27 ASSESSMENT — PAIN DESCRIPTION - PROGRESSION: CLINICAL_PROGRESSION: NOT CHANGED

## 2025-07-27 ASSESSMENT — PAIN SCALES - GENERAL: PAINLEVEL_OUTOF10: 8

## 2025-07-27 ASSESSMENT — PAIN - FUNCTIONAL ASSESSMENT: PAIN_FUNCTIONAL_ASSESSMENT: 0-10

## 2025-07-27 ASSESSMENT — PAIN DESCRIPTION - ORIENTATION: ORIENTATION: RIGHT

## 2025-07-27 ASSESSMENT — PAIN DESCRIPTION - LOCATION: LOCATION: SHOULDER

## 2025-07-28 NOTE — ED PROVIDER NOTES
HPI   Chief Complaint   Patient presents with    Shoulder Injury     Pt states that he tripped over a hose at home falling on and injuring his right shoulder. bPt is guarding the right arm and he cannot left the arm laterally.       HPI  85-year-old male presents with right shoulder pain.  The patient states he tripped over a hose at home landed on his right shoulder right hip and right knee.  He denies any trauma to his head or neck.  He denies any dizziness or lightheadedness before or after the fall.  This happened shortly prior to arrival to the ED.  Pain in his shoulder is slightly worse with movement.  He has previous rose in his right humerus.  No other complaints.      Patient History   Medical History[1]  Surgical History[2]  Family History[3]  Social History[4]    Physical Exam   ED Triage Vitals [07/27/25 2315]   Temperature Heart Rate Respirations BP   36.7 °C (98.1 °F) 70 15 163/84      Pulse Ox Temp Source Heart Rate Source Patient Position   100 % Oral Monitor Sitting      BP Location FiO2 (%)     Left arm --       Physical Exam  General:  Awake, alert, no acute distress.  Head: Normocephalic, Atraumatic  Neck: Supple, trachea midline, no stridor  Skin: Warm and dry, no rashes   Lungs:  No acute respiratory distress, speaking in full sentences without difficulty  Neuro:  No gross focal neurologic deficits, NIH is 0  Musculoskeletal: Right upper extremity: Range of motion limited due to pain, no deformity or swelling, no tenderness to clavicle.  No pain with palpation or range of motion of bilateral lower extremities, pelvis stable  Psychiatric:  Alert oriented x 3, Good insight into condition.    ED Course & MDM   Diagnoses as of 07/27/25 5472   Acute pain of right shoulder                 No data recorded                                 Medical Decision Making  Right shoulder x-ray is negative for fractures or dislocations.  He may have a AC sprain.  I discussed with him at bedside.  He was treated  with Tylenol, provided a sling.  Advised to rest, ice, Tylenol.  Follow-up with his doctor as needed.  Stable for discharge.    Procedure  Procedures       [1]   Past Medical History:  Diagnosis Date    Personal history of other diseases of the circulatory system     History of cardiac disorder   [2]   Past Surgical History:  Procedure Laterality Date    CARDIAC SURGERY  2016    Heart Surgery    CORONARY ARTERY BYPASS GRAFT  2018    CABG    KNEE SURGERY  2016    Knee Surgery    OTHER SURGICAL HISTORY  2016    Repair Of Humerus / Arm   [3]   Family History  Problem Relation Name Age of Onset    Heart disease Father     [4]   Social History  Tobacco Use    Smoking status: Former     Current packs/day: 0.00     Average packs/day: 1 pack/day for 5.0 years (5.0 ttl pk-yrs)     Types: Cigarettes     Start date:      Quit date:      Years since quittin.5     Passive exposure: Past    Smokeless tobacco: Never   Vaping Use    Vaping status: Never Used   Substance Use Topics    Alcohol use: Yes     Comment: socially    Drug use: Never        Rosalino Kirk DO  [phone]

## 2025-07-28 NOTE — DISCHARGE INSTRUCTIONS
Thank you for allowing us to care for you today.  You may receive a survey regarding your visit today.    If you were satisfied with the care and service provided we would be very grateful if you would give us a high rating.    Your feedback is very important to us.    Dr. Kirk        As of today's visit, based on reasonable likelihood, that it is safe for you to be discharged back to your residence to follow-up as an outpatient for ongoing management of your medical problem. You should follow-up with any referrals / primary provider as soon as possible. The contacts (number, addresses) are listed below.         Important:  Even though we think it is safe for you to go home, there is always a small chance that we are missing something that could require hospitalization.  Therefore it is very important that if you get worse or develops any new symptoms that you return here as soon as possible to be re-evaluated.  This includes return of symptoms that have resolved such as fainting, chest pain, or symptoms that could be warning signs for stroke important:  Even though we think it is safe for you to go home, there is always a small chance that we are missing something that could require hospitalization.  Therefore it is very important that if you get worse or develops any new symptoms that you return here as soon as possible to be re-evaluated.  This includes return of symptoms that have resolved such as fainting, chest pain, or symptoms that could be warning signs for stroke         Make sure your pharmacy and primary doctor is aware of any new medications prescribed today.          It is your responsibility to contact as soon as possible, and follow through with, any referrals you were given today. We do recommend you inform them you are a [facility] follow-up patient, as often they can better accommodate your need to be seen, provided their schedules allow. We will, and have, made every effort to ensure you have  access to adequate follow-up specialists available.          All problems may not be able to be fixed in one ER visit. This is why timely ongoing care is important, and this is a responsibility you share in. Further, you are free to follow up with any provider you choose, and this is not limited to our suggestion.          If cultures were obtained today, you will be contacted should anything result that would require further treatment. Please contact the ED at the number provided with questions.          Having trouble affording medications? Try [email or web address] (This is not a hospital endorsed website, merely a recommendation based on my own personal experiences with [employer])

## 2025-08-01 ENCOUNTER — OFFICE VISIT (OUTPATIENT)
Dept: PRIMARY CARE | Facility: CLINIC | Age: 85
End: 2025-08-01
Payer: MEDICARE

## 2025-08-01 VITALS
HEART RATE: 56 BPM | DIASTOLIC BLOOD PRESSURE: 74 MMHG | BODY MASS INDEX: 20.9 KG/M2 | SYSTOLIC BLOOD PRESSURE: 163 MMHG | WEIGHT: 146 LBS | OXYGEN SATURATION: 98 % | HEIGHT: 70 IN | TEMPERATURE: 98.4 F

## 2025-08-01 DIAGNOSIS — M25.511 ACUTE PAIN OF RIGHT SHOULDER: Primary | ICD-10-CM

## 2025-08-01 DIAGNOSIS — S43.491D OTHER SPRAIN OF RIGHT SHOULDER JOINT, SUBSEQUENT ENCOUNTER: ICD-10-CM

## 2025-08-01 PROCEDURE — 3078F DIAST BP <80 MM HG: CPT | Performed by: FAMILY MEDICINE

## 2025-08-01 PROCEDURE — 3077F SYST BP >= 140 MM HG: CPT | Performed by: FAMILY MEDICINE

## 2025-08-01 PROCEDURE — 1159F MED LIST DOCD IN RCRD: CPT | Performed by: FAMILY MEDICINE

## 2025-08-01 PROCEDURE — 99213 OFFICE O/P EST LOW 20 MIN: CPT | Performed by: FAMILY MEDICINE

## 2025-08-01 ASSESSMENT — PATIENT HEALTH QUESTIONNAIRE - PHQ9
2. FEELING DOWN, DEPRESSED OR HOPELESS: NOT AT ALL
1. LITTLE INTEREST OR PLEASURE IN DOING THINGS: NOT AT ALL
SUM OF ALL RESPONSES TO PHQ9 QUESTIONS 1 AND 2: 0

## 2025-08-01 NOTE — PROGRESS NOTES
"Subjective   Patient ID: Olayinka Burnette is a 85 y.o. male who presents for ER Follow-up (Pt was in ER on 7/27/2025 for rt shoulder injury after a fall/Rt shoulder still hurt and some soreness with movement ).    HPI   He is here for follow-up the emergency room.  He was seen on 7/27 for right shoulder pain.  He fell injuring his shoulder.  X-ray in the ER showed no fracture and he was treated for shoulder sprain.  He says that since then he has had a little less pain and a little more range of motion.  The pain does not radiate.  Review of Systems  Denies headache, blurred vision, chest pain, shortness of breath, nausea or vomiting, change in bowel habits or leg pain or swelling.    Objective   /74 (BP Location: Left arm, Patient Position: Sitting, BP Cuff Size: Adult)   Pulse 56   Temp 36.9 °C (98.4 °F) (Temporal)   Ht 1.778 m (5' 10\")   Wt 66.2 kg (146 lb)   SpO2 98%   BMI 20.95 kg/m²     Physical Exam  Vitals and nurse's notes reviewed  General: no acute distress  Neck: Supple  Lungs: Clear  Cardio: RRR w/o murmur    Extremities: Right shoulder with no point tenderness.  Abduction is limited to about 10 degrees due to pain.  Flexion extension limited to about 45 degrees.  Good handgrip strength.  Pulses are intact.  Neuro: Alert and oriented with no focal deficits    Assessment/Plan   Assessment & Plan  Acute pain of right shoulder    Orders:    Referral to Physical Therapy; Future    Other sprain of right shoulder joint, subsequent encounter  Will refer to physical therapy to work on increased range of motion to prevent frozen shoulder.  In the meantime he can use topical anti-inflammatories.  Keep appointment as scheduled with me in 1 month.              " stated

## 2025-08-01 NOTE — ASSESSMENT & PLAN NOTE
Will refer to physical therapy to work on increased range of motion to prevent frozen shoulder.  In the meantime he can use topical anti-inflammatories.  Keep appointment as scheduled with me in 1 month.

## 2025-08-11 DIAGNOSIS — E78.00 PURE HYPERCHOLESTEROLEMIA: ICD-10-CM

## 2025-08-11 DIAGNOSIS — R73.03 PREDIABETES: ICD-10-CM

## 2025-08-11 DIAGNOSIS — I10 BENIGN ESSENTIAL HTN: ICD-10-CM

## 2025-08-11 DIAGNOSIS — Z79.899 MEDICATION MANAGEMENT: ICD-10-CM

## 2025-08-13 ENCOUNTER — EVALUATION (OUTPATIENT)
Dept: PHYSICAL THERAPY | Facility: CLINIC | Age: 85
End: 2025-08-13
Payer: MEDICARE

## 2025-08-13 DIAGNOSIS — M25.519 PAIN IN SHOULDER: Primary | ICD-10-CM

## 2025-08-13 PROCEDURE — 97162 PT EVAL MOD COMPLEX 30 MIN: CPT | Mod: GP

## 2025-09-03 ENCOUNTER — TREATMENT (OUTPATIENT)
Dept: PHYSICAL THERAPY | Facility: CLINIC | Age: 85
End: 2025-09-03
Payer: MEDICARE

## 2025-09-03 DIAGNOSIS — M25.519 PAIN IN SHOULDER: ICD-10-CM

## 2025-09-03 PROCEDURE — 97110 THERAPEUTIC EXERCISES: CPT | Mod: GP

## 2025-09-11 ENCOUNTER — APPOINTMENT (OUTPATIENT)
Dept: PRIMARY CARE | Facility: CLINIC | Age: 85
End: 2025-09-11
Payer: MEDICARE

## 2025-09-16 ENCOUNTER — APPOINTMENT (OUTPATIENT)
Dept: PRIMARY CARE | Facility: CLINIC | Age: 85
End: 2025-09-16
Payer: MEDICARE